# Patient Record
Sex: FEMALE | Race: WHITE | NOT HISPANIC OR LATINO
[De-identification: names, ages, dates, MRNs, and addresses within clinical notes are randomized per-mention and may not be internally consistent; named-entity substitution may affect disease eponyms.]

---

## 2017-01-26 ENCOUNTER — APPOINTMENT (OUTPATIENT)
Dept: MRI IMAGING | Facility: IMAGING CENTER | Age: 62
End: 2017-01-26

## 2017-01-26 ENCOUNTER — OUTPATIENT (OUTPATIENT)
Dept: OUTPATIENT SERVICES | Facility: HOSPITAL | Age: 62
LOS: 1 days | End: 2017-01-26
Payer: COMMERCIAL

## 2017-01-26 DIAGNOSIS — Z00.8 ENCOUNTER FOR OTHER GENERAL EXAMINATION: ICD-10-CM

## 2017-01-26 PROCEDURE — 73721 MRI JNT OF LWR EXTRE W/O DYE: CPT

## 2017-01-26 PROCEDURE — 73721 MRI JNT OF LWR EXTRE W/O DYE: CPT | Mod: 26,RT

## 2017-02-01 ENCOUNTER — APPOINTMENT (OUTPATIENT)
Dept: ORTHOPEDIC SURGERY | Facility: CLINIC | Age: 62
End: 2017-02-01

## 2017-09-16 ENCOUNTER — OUTPATIENT (OUTPATIENT)
Dept: OUTPATIENT SERVICES | Facility: HOSPITAL | Age: 62
LOS: 1 days | End: 2017-09-16
Payer: COMMERCIAL

## 2017-09-16 ENCOUNTER — APPOINTMENT (OUTPATIENT)
Dept: MRI IMAGING | Facility: IMAGING CENTER | Age: 62
End: 2017-09-16
Payer: COMMERCIAL

## 2017-09-16 DIAGNOSIS — Z00.8 ENCOUNTER FOR OTHER GENERAL EXAMINATION: ICD-10-CM

## 2017-09-16 PROCEDURE — 72141 MRI NECK SPINE W/O DYE: CPT

## 2017-09-16 PROCEDURE — 72141 MRI NECK SPINE W/O DYE: CPT | Mod: 26

## 2017-12-15 ENCOUNTER — APPOINTMENT (OUTPATIENT)
Dept: ORTHOPEDIC SURGERY | Facility: CLINIC | Age: 62
End: 2017-12-15
Payer: COMMERCIAL

## 2017-12-15 VITALS — DIASTOLIC BLOOD PRESSURE: 79 MMHG | SYSTOLIC BLOOD PRESSURE: 121 MMHG | HEART RATE: 79 BPM

## 2017-12-15 VITALS — BODY MASS INDEX: 26.58 KG/M2 | WEIGHT: 150 LBS | HEIGHT: 63 IN

## 2017-12-15 PROCEDURE — 99203 OFFICE O/P NEW LOW 30 MIN: CPT | Mod: 25

## 2017-12-15 PROCEDURE — 20610 DRAIN/INJ JOINT/BURSA W/O US: CPT | Mod: LT

## 2017-12-15 PROCEDURE — 73030 X-RAY EXAM OF SHOULDER: CPT | Mod: LT

## 2018-02-14 ENCOUNTER — APPOINTMENT (OUTPATIENT)
Dept: GASTROENTEROLOGY | Facility: CLINIC | Age: 63
End: 2018-02-14
Payer: COMMERCIAL

## 2018-02-14 VITALS
WEIGHT: 155 LBS | RESPIRATION RATE: 16 BRPM | OXYGEN SATURATION: 98 % | BODY MASS INDEX: 27.46 KG/M2 | HEART RATE: 78 BPM | SYSTOLIC BLOOD PRESSURE: 120 MMHG | HEIGHT: 63 IN | DIASTOLIC BLOOD PRESSURE: 62 MMHG

## 2018-02-14 DIAGNOSIS — Z78.9 OTHER SPECIFIED HEALTH STATUS: ICD-10-CM

## 2018-02-14 DIAGNOSIS — Z82.61 FAMILY HISTORY OF ARTHRITIS: ICD-10-CM

## 2018-02-14 PROCEDURE — 99203 OFFICE O/P NEW LOW 30 MIN: CPT

## 2018-02-14 RX ORDER — AMOXICILLIN 500 MG/1
500 CAPSULE ORAL
Qty: 14 | Refills: 0 | Status: DISCONTINUED | COMMUNITY
Start: 2017-08-30 | End: 2018-02-14

## 2018-02-15 ENCOUNTER — APPOINTMENT (OUTPATIENT)
Dept: ORTHOPEDIC SURGERY | Facility: CLINIC | Age: 63
End: 2018-02-15
Payer: COMMERCIAL

## 2018-02-15 PROCEDURE — 99213 OFFICE O/P EST LOW 20 MIN: CPT

## 2018-02-20 ENCOUNTER — RX RENEWAL (OUTPATIENT)
Age: 63
End: 2018-02-20

## 2018-02-21 ENCOUNTER — FORM ENCOUNTER (OUTPATIENT)
Age: 63
End: 2018-02-21

## 2018-02-21 ENCOUNTER — RESULT REVIEW (OUTPATIENT)
Age: 63
End: 2018-02-21

## 2018-02-21 ENCOUNTER — APPOINTMENT (OUTPATIENT)
Dept: GASTROENTEROLOGY | Facility: HOSPITAL | Age: 63
End: 2018-02-21

## 2018-02-21 ENCOUNTER — OUTPATIENT (OUTPATIENT)
Dept: OUTPATIENT SERVICES | Facility: HOSPITAL | Age: 63
LOS: 1 days | End: 2018-02-21
Payer: COMMERCIAL

## 2018-02-21 DIAGNOSIS — Z12.11 ENCOUNTER FOR SCREENING FOR MALIGNANT NEOPLASM OF COLON: ICD-10-CM

## 2018-02-21 PROCEDURE — 88305 TISSUE EXAM BY PATHOLOGIST: CPT

## 2018-02-21 PROCEDURE — 88305 TISSUE EXAM BY PATHOLOGIST: CPT | Mod: 26

## 2018-02-21 PROCEDURE — 45380 COLONOSCOPY AND BIOPSY: CPT | Mod: 33

## 2018-02-21 PROCEDURE — 45380 COLONOSCOPY AND BIOPSY: CPT | Mod: PT

## 2018-02-21 RX ORDER — SODIUM SULFATE, POTASSIUM SULFATE, MAGNESIUM SULFATE 17.5; 3.13; 1.6 G/ML; G/ML; G/ML
17.5-3.13-1.6 SOLUTION, CONCENTRATE ORAL
Qty: 1 | Refills: 0 | Status: DISCONTINUED | COMMUNITY
Start: 2018-02-14 | End: 2018-02-21

## 2018-02-22 ENCOUNTER — OUTPATIENT (OUTPATIENT)
Dept: OUTPATIENT SERVICES | Facility: HOSPITAL | Age: 63
LOS: 1 days | End: 2018-02-22
Payer: COMMERCIAL

## 2018-02-22 ENCOUNTER — APPOINTMENT (OUTPATIENT)
Dept: MRI IMAGING | Facility: IMAGING CENTER | Age: 63
End: 2018-02-22

## 2018-02-22 ENCOUNTER — TRANSCRIPTION ENCOUNTER (OUTPATIENT)
Age: 63
End: 2018-02-22

## 2018-02-22 DIAGNOSIS — M75.42 IMPINGEMENT SYNDROME OF LEFT SHOULDER: ICD-10-CM

## 2018-02-22 PROCEDURE — 73221 MRI JOINT UPR EXTREM W/O DYE: CPT | Mod: 26,LT

## 2018-02-22 PROCEDURE — 73221 MRI JOINT UPR EXTREM W/O DYE: CPT

## 2018-02-28 ENCOUNTER — APPOINTMENT (OUTPATIENT)
Dept: ORTHOPEDIC SURGERY | Facility: CLINIC | Age: 63
End: 2018-02-28
Payer: COMMERCIAL

## 2018-02-28 ENCOUNTER — APPOINTMENT (OUTPATIENT)
Dept: ORTHOPEDIC SURGERY | Facility: CLINIC | Age: 63
End: 2018-02-28

## 2018-02-28 DIAGNOSIS — M25.512 PAIN IN LEFT SHOULDER: ICD-10-CM

## 2018-02-28 PROCEDURE — 99213 OFFICE O/P EST LOW 20 MIN: CPT

## 2018-03-06 ENCOUNTER — OUTPATIENT (OUTPATIENT)
Dept: OUTPATIENT SERVICES | Facility: HOSPITAL | Age: 63
LOS: 1 days | End: 2018-03-06
Payer: COMMERCIAL

## 2018-03-06 VITALS
HEART RATE: 69 BPM | TEMPERATURE: 97 F | HEIGHT: 62 IN | OXYGEN SATURATION: 98 % | RESPIRATION RATE: 18 BRPM | SYSTOLIC BLOOD PRESSURE: 122 MMHG | WEIGHT: 154.98 LBS | DIASTOLIC BLOOD PRESSURE: 72 MMHG

## 2018-03-06 DIAGNOSIS — Z98.891 HISTORY OF UTERINE SCAR FROM PREVIOUS SURGERY: Chronic | ICD-10-CM

## 2018-03-06 DIAGNOSIS — Z98.890 OTHER SPECIFIED POSTPROCEDURAL STATES: Chronic | ICD-10-CM

## 2018-03-06 DIAGNOSIS — S46.812D STRAIN OF OTHER MUSCLES, FASCIA AND TENDONS AT SHOULDER AND UPPER ARM LEVEL, LEFT ARM, SUBSEQUENT ENCOUNTER: ICD-10-CM

## 2018-03-06 DIAGNOSIS — Z90.49 ACQUIRED ABSENCE OF OTHER SPECIFIED PARTS OF DIGESTIVE TRACT: Chronic | ICD-10-CM

## 2018-03-06 DIAGNOSIS — M75.42 IMPINGEMENT SYNDROME OF LEFT SHOULDER: ICD-10-CM

## 2018-03-06 LAB
ALBUMIN SERPL ELPH-MCNC: 4.7 G/DL — SIGNIFICANT CHANGE UP (ref 3.3–5)
ALP SERPL-CCNC: 59 U/L — SIGNIFICANT CHANGE UP (ref 40–120)
ALT FLD-CCNC: 28 U/L — SIGNIFICANT CHANGE UP (ref 4–33)
AST SERPL-CCNC: 25 U/L — SIGNIFICANT CHANGE UP (ref 4–32)
BILIRUB SERPL-MCNC: 0.3 MG/DL — SIGNIFICANT CHANGE UP (ref 0.2–1.2)
BUN SERPL-MCNC: 16 MG/DL — SIGNIFICANT CHANGE UP (ref 7–23)
CALCIUM SERPL-MCNC: 9.1 MG/DL — SIGNIFICANT CHANGE UP (ref 8.4–10.5)
CHLORIDE SERPL-SCNC: 103 MMOL/L — SIGNIFICANT CHANGE UP (ref 98–107)
CO2 SERPL-SCNC: 27 MMOL/L — SIGNIFICANT CHANGE UP (ref 22–31)
CREAT SERPL-MCNC: 0.65 MG/DL — SIGNIFICANT CHANGE UP (ref 0.5–1.3)
GLUCOSE SERPL-MCNC: 103 MG/DL — HIGH (ref 70–99)
HCT VFR BLD CALC: 41 % — SIGNIFICANT CHANGE UP (ref 34.5–45)
HCV AB S/CO SERPL IA: 0.11 S/CO — SIGNIFICANT CHANGE UP
HCV AB SERPL-IMP: SIGNIFICANT CHANGE UP
HGB BLD-MCNC: 13.1 G/DL — SIGNIFICANT CHANGE UP (ref 11.5–15.5)
MCHC RBC-ENTMCNC: 28.9 PG — SIGNIFICANT CHANGE UP (ref 27–34)
MCHC RBC-ENTMCNC: 32 % — SIGNIFICANT CHANGE UP (ref 32–36)
MCV RBC AUTO: 90.5 FL — SIGNIFICANT CHANGE UP (ref 80–100)
NRBC # FLD: 0 — SIGNIFICANT CHANGE UP
PLATELET # BLD AUTO: 348 K/UL — SIGNIFICANT CHANGE UP (ref 150–400)
PMV BLD: 9.1 FL — SIGNIFICANT CHANGE UP (ref 7–13)
POTASSIUM SERPL-MCNC: 4.5 MMOL/L — SIGNIFICANT CHANGE UP (ref 3.5–5.3)
POTASSIUM SERPL-SCNC: 4.5 MMOL/L — SIGNIFICANT CHANGE UP (ref 3.5–5.3)
PROT SERPL-MCNC: 7.5 G/DL — SIGNIFICANT CHANGE UP (ref 6–8.3)
RBC # BLD: 4.53 M/UL — SIGNIFICANT CHANGE UP (ref 3.8–5.2)
RBC # FLD: 13.1 % — SIGNIFICANT CHANGE UP (ref 10.3–14.5)
SODIUM SERPL-SCNC: 143 MMOL/L — SIGNIFICANT CHANGE UP (ref 135–145)
WBC # BLD: 7.65 K/UL — SIGNIFICANT CHANGE UP (ref 3.8–10.5)
WBC # FLD AUTO: 7.65 K/UL — SIGNIFICANT CHANGE UP (ref 3.8–10.5)

## 2018-03-06 PROCEDURE — 93010 ELECTROCARDIOGRAM REPORT: CPT

## 2018-03-06 NOTE — H&P PST ADULT - NEGATIVE OPHTHALMOLOGIC SYMPTOMS
no pain R/no pain L/no irritation R/no discharge R/no photophobia/no blurred vision R/no blurred vision L/no discharge L/no loss of vision R/no scleral injection R/no lacrimation L/no lacrimation R/no diplopia/no irritation L/no loss of vision L/no scleral injection L

## 2018-03-06 NOTE — H&P PST ADULT - NEGATIVE CARDIOVASCULAR SYMPTOMS
no dyspnea on exertion/no orthopnea/no palpitations/no peripheral edema/no paroxysmal nocturnal dyspnea/no chest pain/no claudication

## 2018-03-06 NOTE — H&P PST ADULT - PMH
Carpal tunnel syndrome    Cholecystitis    GERD (gastroesophageal reflux disease)    Meniscal injury  right knee  Shingles

## 2018-03-06 NOTE — H&P PST ADULT - FAMILY HISTORY
Father  Still living? Yes, Estimated age: Age Unknown  Family history of diabetes mellitus (DM), Age at diagnosis: Age Unknown  Family history of other heart disease, Age at diagnosis: Age Unknown     Mother  Still living? Yes, Estimated age: Age Unknown  Family history of GERD, Age at diagnosis: Age Unknown

## 2018-03-06 NOTE — H&P PST ADULT - NSANTHOSAYNRD_GEN_A_CORE
never tested/No. ERIN screening performed.  STOP BANG Legend: 0-2 = LOW Risk; 3-4 = INTERMEDIATE Risk; 5-8 = HIGH Risk

## 2018-03-06 NOTE — H&P PST ADULT - RS GEN PE MLT RESP DETAILS PC
clear to auscultation bilaterally/respirations non-labored/no rhonchi/no wheezes/no intercostal retractions/no rales/good air movement/no subcutaneous emphysema/breath sounds equal/airway patent

## 2018-03-06 NOTE — H&P PST ADULT - NEGATIVE MUSCULOSKELETAL SYMPTOMS
no muscle weakness/no stiffness/no arm pain R/no leg pain L/no neck pain/no arthritis/no leg pain R/no muscle cramps

## 2018-03-06 NOTE — H&P PST ADULT - NEGATIVE GASTROINTESTINAL SYMPTOMS
no abdominal pain/no hematochezia/no constipation/no melena/no change in bowel habits/no vomiting/no diarrhea/no flatulence/no nausea

## 2018-03-06 NOTE — H&P PST ADULT - VISION (WITH CORRECTIVE LENSES IF THE PATIENT USUALLY WEARS THEM):
Partially impaired: cannot see medication labels or newsprint, but can see obstacles in path, and the surrounding layout; can count fingers at arm's length/pt wears glasses

## 2018-03-06 NOTE — H&P PST ADULT - MUSCULOSKELETAL
details… detailed exam decreased ROM due to pain/no joint warmth/no calf tenderness/no joint erythema/diminished strength

## 2018-03-06 NOTE — H&P PST ADULT - PROBLEM SELECTOR PLAN 1
Pt is scheduled for a left shoulder arthroscopy, rotator cuff repair, subacromial decompression on 3/8/18.   Preop instructions provided including Pepcid, Hibiclens protocols, NPO status, no NSAIDs or OTC meds/ herbals starting today. Verbilized understanding.   Pt states was seen by pcp 2 wk's ago. Pt asked to return for repeat ekg due to  cough symptoms noted and showing abnormal ekg. called office, Spoke to Yasmin, stated Pt was cleared, will fax reports. (BW and EKG reports not sent while pt t PST, done).   Pt. stated Hx of Hepatitis but unsure of type. Hep C ordered.   Pt is a JW, HCP copy placed in chart. States Surgeon aware and has a copy. OR booking notified as well. Pt is scheduled for a left shoulder arthroscopy, rotator cuff repair, subacromial decompression on 3/8/18.   Preop instructions provided including Pepcid, Hibiclens protocols, NPO status, no NSAIDs or OTC meds/ herbals starting today. Verbalized understanding.   Pt states was seen by pcp 2 wk's ago. Pt asked to return for repeat ekg due to  cough symptoms noted and showing abnormal ekg. called office, Spoke to Yasmin, stated Pt was cleared, will fax reports. (BW and EKG reports not sent while pt t PST, done prior to leaving unit). PST to follow up on records.   Pt. stated Hx of Hepatitis but unsure of type. Hep C ordered.   Pt is a JW, HCP copy placed in chart. States Surgeon aware and has a copy. OR booking notified as well.

## 2018-03-06 NOTE — H&P PST ADULT - NEGATIVE PSYCHIATRIC SYMPTOMS
no visual hallucinations/no memory loss/no auditory hallucinations/no paranoia/no insomnia/no mood swings/no agitation/no hyperactivity/no suicidal ideation/no depression

## 2018-03-06 NOTE — H&P PST ADULT - HISTORY OF PRESENT ILLNESS
63 y/o female with PMH of Hepatitis (unsure of type), herniated disc of cervical and lumbar spine. Presents to PST with a preop dx of impingement syndrome of left shoulder, strain of other muscles, fascia and tendons at shoulder and upper arm level, left arm, subsequent encounter and to be evaluated for a scheduled left shoulder arthroscopy, rotator cuff repair, subacromial decompression on 3/8/18. Pt states since 2 months has been feeling pain after doing push ups. Pain worsen so went to see Dr Hernandez who gave her a cortisone injection w/o relief. An MRI done and revealed "an injury" as per pt. Recommended surgical intervention at this time for which is now scheduled.

## 2018-03-06 NOTE — H&P PST ADULT - ASSESSMENT
DX: impingement syndrome of left shoulder, strain of other muscles, fascia and tendons at shoulder and upper arm level, left arm, subsequent encounter

## 2018-03-06 NOTE — H&P PST ADULT - NEGATIVE ALLERGY TYPES
no reactions to food/no reactions to insect bites/no outdoor environmental allergies/no indoor environmental allergies/no reactions to animals

## 2018-03-08 ENCOUNTER — OUTPATIENT (OUTPATIENT)
Dept: OUTPATIENT SERVICES | Facility: HOSPITAL | Age: 63
LOS: 1 days | Discharge: HOME | End: 2018-03-08
Payer: COMMERCIAL

## 2018-03-08 ENCOUNTER — TRANSCRIPTION ENCOUNTER (OUTPATIENT)
Age: 63
End: 2018-03-08

## 2018-03-08 ENCOUNTER — APPOINTMENT (OUTPATIENT)
Dept: ORTHOPEDIC SURGERY | Facility: AMBULATORY SURGERY CENTER | Age: 63
End: 2018-03-08

## 2018-03-08 VITALS
DIASTOLIC BLOOD PRESSURE: 70 MMHG | HEART RATE: 87 BPM | TEMPERATURE: 98 F | SYSTOLIC BLOOD PRESSURE: 128 MMHG | OXYGEN SATURATION: 100 % | RESPIRATION RATE: 14 BRPM

## 2018-03-08 VITALS
HEART RATE: 65 BPM | SYSTOLIC BLOOD PRESSURE: 128 MMHG | OXYGEN SATURATION: 97 % | DIASTOLIC BLOOD PRESSURE: 67 MMHG | TEMPERATURE: 98 F | WEIGHT: 154.98 LBS | RESPIRATION RATE: 18 BRPM | HEIGHT: 62 IN

## 2018-03-08 DIAGNOSIS — Z98.890 OTHER SPECIFIED POSTPROCEDURAL STATES: Chronic | ICD-10-CM

## 2018-03-08 DIAGNOSIS — Z98.891 HISTORY OF UTERINE SCAR FROM PREVIOUS SURGERY: Chronic | ICD-10-CM

## 2018-03-08 DIAGNOSIS — S46.812D STRAIN OF OTHER MUSCLES, FASCIA AND TENDONS AT SHOULDER AND UPPER ARM LEVEL, LEFT ARM, SUBSEQUENT ENCOUNTER: ICD-10-CM

## 2018-03-08 DIAGNOSIS — Z90.49 ACQUIRED ABSENCE OF OTHER SPECIFIED PARTS OF DIGESTIVE TRACT: Chronic | ICD-10-CM

## 2018-03-08 PROCEDURE — 29827 SHO ARTHRS SRG RT8TR CUF RPR: CPT | Mod: LT

## 2018-03-08 PROCEDURE — 29826 SHO ARTHRS SRG DECOMPRESSION: CPT | Mod: LT

## 2018-03-08 RX ORDER — OXYCODONE HYDROCHLORIDE 5 MG/1
1 TABLET ORAL
Qty: 50 | Refills: 0 | OUTPATIENT
Start: 2018-03-08

## 2018-03-08 NOTE — ASU DISCHARGE PLAN (ADULT/PEDIATRIC). - PAIN
prescription called to pharmacy/Do not mix Percocet with Tylenol (acetaminophen) as it already contains Tylenol.

## 2018-03-08 NOTE — ASU DISCHARGE PLAN (ADULT/PEDIATRIC). - NURSING INSTRUCTIONS
Narcotic pain medication may cause nausea or constipation. Take medication with food. Increase fluids and fiber intake.     Apply ice for 20 minutes several times per day for the next 72 hours, then as needed for comfort.

## 2018-03-08 NOTE — ASU DISCHARGE PLAN (ADULT/PEDIATRIC). - ASU FOLLOWUP
[facility] ([facility]):

## 2018-03-16 ENCOUNTER — APPOINTMENT (OUTPATIENT)
Dept: ORTHOPEDIC SURGERY | Facility: CLINIC | Age: 63
End: 2018-03-16
Payer: COMMERCIAL

## 2018-03-16 PROCEDURE — 99024 POSTOP FOLLOW-UP VISIT: CPT

## 2018-04-06 ENCOUNTER — APPOINTMENT (OUTPATIENT)
Dept: ORTHOPEDIC SURGERY | Facility: CLINIC | Age: 63
End: 2018-04-06
Payer: COMMERCIAL

## 2018-04-06 PROCEDURE — 99024 POSTOP FOLLOW-UP VISIT: CPT

## 2018-05-09 ENCOUNTER — APPOINTMENT (OUTPATIENT)
Dept: ORTHOPEDIC SURGERY | Facility: CLINIC | Age: 63
End: 2018-05-09
Payer: COMMERCIAL

## 2018-05-09 DIAGNOSIS — M17.0 BILATERAL PRIMARY OSTEOARTHRITIS OF KNEE: ICD-10-CM

## 2018-05-09 PROCEDURE — 99024 POSTOP FOLLOW-UP VISIT: CPT

## 2018-06-29 ENCOUNTER — APPOINTMENT (OUTPATIENT)
Dept: ORTHOPEDIC SURGERY | Facility: CLINIC | Age: 63
End: 2018-06-29
Payer: COMMERCIAL

## 2018-06-29 DIAGNOSIS — M75.42 IMPINGEMENT SYNDROME OF LEFT SHOULDER: ICD-10-CM

## 2018-06-29 PROCEDURE — 99213 OFFICE O/P EST LOW 20 MIN: CPT

## 2018-08-28 ENCOUNTER — APPOINTMENT (OUTPATIENT)
Dept: MRI IMAGING | Facility: IMAGING CENTER | Age: 63
End: 2018-08-28
Payer: COMMERCIAL

## 2018-08-28 ENCOUNTER — OUTPATIENT (OUTPATIENT)
Dept: OUTPATIENT SERVICES | Facility: HOSPITAL | Age: 63
LOS: 1 days | End: 2018-08-28
Payer: COMMERCIAL

## 2018-08-28 DIAGNOSIS — Z98.891 HISTORY OF UTERINE SCAR FROM PREVIOUS SURGERY: Chronic | ICD-10-CM

## 2018-08-28 DIAGNOSIS — Z00.8 ENCOUNTER FOR OTHER GENERAL EXAMINATION: ICD-10-CM

## 2018-08-28 DIAGNOSIS — Z90.49 ACQUIRED ABSENCE OF OTHER SPECIFIED PARTS OF DIGESTIVE TRACT: Chronic | ICD-10-CM

## 2018-08-28 DIAGNOSIS — Z98.890 OTHER SPECIFIED POSTPROCEDURAL STATES: Chronic | ICD-10-CM

## 2018-08-28 PROBLEM — S83.8X9A SPRAIN OF OTHER SPECIFIED PARTS OF UNSPECIFIED KNEE, INITIAL ENCOUNTER: Chronic | Status: ACTIVE | Noted: 2018-03-06

## 2018-08-28 PROBLEM — G56.00 CARPAL TUNNEL SYNDROME, UNSPECIFIED UPPER LIMB: Chronic | Status: ACTIVE | Noted: 2018-03-06

## 2018-08-28 PROBLEM — K81.9 CHOLECYSTITIS, UNSPECIFIED: Chronic | Status: ACTIVE | Noted: 2018-03-06

## 2018-08-28 PROCEDURE — 72148 MRI LUMBAR SPINE W/O DYE: CPT | Mod: 26

## 2018-08-28 PROCEDURE — 72148 MRI LUMBAR SPINE W/O DYE: CPT

## 2018-09-20 ENCOUNTER — APPOINTMENT (OUTPATIENT)
Dept: ORTHOPEDIC SURGERY | Facility: CLINIC | Age: 63
End: 2018-09-20
Payer: COMMERCIAL

## 2018-09-20 VITALS
HEART RATE: 63 BPM | BODY MASS INDEX: 27.46 KG/M2 | SYSTOLIC BLOOD PRESSURE: 113 MMHG | HEIGHT: 63 IN | DIASTOLIC BLOOD PRESSURE: 74 MMHG | WEIGHT: 155 LBS

## 2018-09-20 DIAGNOSIS — S46.812D STRAIN OF OTHER MUSCLES, FASCIA AND TENDONS AT SHOULDER AND UPPER ARM LEVEL, LEFT ARM, SUBSEQUENT ENCOUNTER: ICD-10-CM

## 2018-09-20 DIAGNOSIS — Z98.890 OTHER SPECIFIED POSTPROCEDURAL STATES: ICD-10-CM

## 2018-09-20 PROCEDURE — 99213 OFFICE O/P EST LOW 20 MIN: CPT

## 2018-10-17 ENCOUNTER — FORM ENCOUNTER (OUTPATIENT)
Age: 63
End: 2018-10-17

## 2018-10-18 ENCOUNTER — OUTPATIENT (OUTPATIENT)
Dept: OUTPATIENT SERVICES | Facility: HOSPITAL | Age: 63
LOS: 1 days | End: 2018-10-18
Payer: COMMERCIAL

## 2018-10-18 ENCOUNTER — APPOINTMENT (OUTPATIENT)
Dept: NEUROSURGERY | Facility: CLINIC | Age: 63
End: 2018-10-18
Payer: COMMERCIAL

## 2018-10-18 VITALS
RESPIRATION RATE: 18 BRPM | DIASTOLIC BLOOD PRESSURE: 71 MMHG | HEART RATE: 70 BPM | WEIGHT: 157 LBS | HEIGHT: 63 IN | OXYGEN SATURATION: 98 % | SYSTOLIC BLOOD PRESSURE: 121 MMHG | BODY MASS INDEX: 27.82 KG/M2

## 2018-10-18 DIAGNOSIS — Z98.891 HISTORY OF UTERINE SCAR FROM PREVIOUS SURGERY: Chronic | ICD-10-CM

## 2018-10-18 DIAGNOSIS — Z87.39 PERSONAL HISTORY OF OTHER DISEASES OF THE MUSCULOSKELETAL SYSTEM AND CONNECTIVE TISSUE: ICD-10-CM

## 2018-10-18 DIAGNOSIS — Z98.890 OTHER SPECIFIED POSTPROCEDURAL STATES: Chronic | ICD-10-CM

## 2018-10-18 DIAGNOSIS — Z86.59 PERSONAL HISTORY OF OTHER MENTAL AND BEHAVIORAL DISORDERS: ICD-10-CM

## 2018-10-18 DIAGNOSIS — Z90.49 ACQUIRED ABSENCE OF OTHER SPECIFIED PARTS OF DIGESTIVE TRACT: Chronic | ICD-10-CM

## 2018-10-18 DIAGNOSIS — M48.061 SPINAL STENOSIS, LUMBAR REGION WITHOUT NEUROGENIC CLAUDICATION: ICD-10-CM

## 2018-10-18 DIAGNOSIS — Z87.828 PERSONAL HISTORY OF OTHER (HEALED) PHYSICAL INJURY AND TRAUMA: ICD-10-CM

## 2018-10-18 DIAGNOSIS — Z86.19 PERSONAL HISTORY OF OTHER INFECTIOUS AND PARASITIC DISEASES: ICD-10-CM

## 2018-10-18 DIAGNOSIS — M43.16 SPONDYLOLISTHESIS, LUMBAR REGION: ICD-10-CM

## 2018-10-18 DIAGNOSIS — M54.5 LOW BACK PAIN: ICD-10-CM

## 2018-10-18 PROCEDURE — 99244 OFF/OP CNSLTJ NEW/EST MOD 40: CPT

## 2018-10-18 PROCEDURE — 72083 X-RAY EXAM ENTIRE SPI 4/5 VW: CPT | Mod: 26

## 2018-10-18 PROCEDURE — 72110 X-RAY EXAM L-2 SPINE 4/>VWS: CPT

## 2018-10-18 PROCEDURE — 72082 X-RAY EXAM ENTIRE SPI 2/3 VW: CPT

## 2018-10-18 RX ORDER — ACETAMINOPHEN 325 MG
TABLET ORAL
Refills: 0 | Status: ACTIVE | COMMUNITY

## 2018-10-18 RX ORDER — POLYETHYLENE GLYCOL 3350, SODIUM CHLORIDE, POTASSIUM CHLORIDE, SODIUM BICARBONATE, AND SODIUM SULFATE 240; 5.84; 2.98; 6.72; 22.72 G/4L; G/4L; G/4L; G/4L; G/4L
240 POWDER, FOR SOLUTION ORAL
Qty: 1 | Refills: 0 | Status: DISCONTINUED | COMMUNITY
Start: 2018-02-20 | End: 2018-10-18

## 2018-10-18 RX ORDER — OXYCODONE AND ACETAMINOPHEN 5; 325 MG/1; MG/1
5-325 TABLET ORAL
Qty: 42 | Refills: 0 | Status: DISCONTINUED | COMMUNITY
Start: 2018-03-08 | End: 2018-10-18

## 2018-10-18 RX ORDER — FLUOCINONIDE 0.5 MG/ML
0.05 SOLUTION TOPICAL
Qty: 60 | Refills: 0 | Status: DISCONTINUED | COMMUNITY
Start: 2017-06-23 | End: 2018-10-18

## 2018-10-18 RX ORDER — TACROLIMUS 0.3 MG/G
0.03 OINTMENT TOPICAL
Qty: 100 | Refills: 0 | Status: DISCONTINUED | COMMUNITY
Start: 2016-09-12 | End: 2018-10-18

## 2018-10-18 RX ORDER — DESONIDE 0.5 MG/G
0.05 OINTMENT TOPICAL
Qty: 60 | Refills: 0 | Status: DISCONTINUED | COMMUNITY
Start: 2016-09-12 | End: 2018-10-18

## 2018-10-18 RX ORDER — BACILLUS COAGULANS/INULIN 1B-250 MG
CAPSULE ORAL
Refills: 0 | Status: ACTIVE | COMMUNITY

## 2018-10-18 RX ORDER — CYCLOBENZAPRINE HYDROCHLORIDE 5 MG/1
5 TABLET, FILM COATED ORAL
Qty: 15 | Refills: 0 | Status: DISCONTINUED | COMMUNITY
Start: 2017-07-30 | End: 2018-10-18

## 2018-10-18 RX ORDER — METHYLPREDNISOLONE 4 MG/1
4 TABLET ORAL
Qty: 21 | Refills: 0 | Status: DISCONTINUED | COMMUNITY
Start: 2017-08-03 | End: 2018-10-18

## 2018-10-18 RX ORDER — MV-MIN/FOLIC/VIT K/LYCOP/COQ10 200-100MCG
CAPSULE ORAL
Refills: 0 | Status: ACTIVE | COMMUNITY

## 2018-10-18 RX ORDER — GABAPENTIN 300 MG/1
300 CAPSULE ORAL
Qty: 180 | Refills: 0 | Status: DISCONTINUED | COMMUNITY
Start: 2017-08-03 | End: 2018-10-18

## 2018-10-18 RX ORDER — TRAMADOL HYDROCHLORIDE 50 MG/1
50 TABLET, COATED ORAL
Qty: 10 | Refills: 0 | Status: DISCONTINUED | COMMUNITY
Start: 2017-07-30 | End: 2018-10-18

## 2018-10-19 PROBLEM — M54.5 MECHANICAL LOW BACK PAIN: Status: ACTIVE | Noted: 2018-10-18

## 2018-11-02 ENCOUNTER — APPOINTMENT (OUTPATIENT)
Dept: ORTHOPEDIC SURGERY | Facility: CLINIC | Age: 63
End: 2018-11-02
Payer: COMMERCIAL

## 2018-11-02 VITALS
BODY MASS INDEX: 27.82 KG/M2 | SYSTOLIC BLOOD PRESSURE: 120 MMHG | WEIGHT: 157 LBS | HEART RATE: 71 BPM | HEIGHT: 63 IN | DIASTOLIC BLOOD PRESSURE: 78 MMHG

## 2018-11-02 PROCEDURE — 73562 X-RAY EXAM OF KNEE 3: CPT | Mod: RT

## 2018-11-02 PROCEDURE — 99213 OFFICE O/P EST LOW 20 MIN: CPT

## 2018-11-09 ENCOUNTER — FORM ENCOUNTER (OUTPATIENT)
Age: 63
End: 2018-11-09

## 2018-11-10 ENCOUNTER — APPOINTMENT (OUTPATIENT)
Dept: CT IMAGING | Facility: HOSPITAL | Age: 63
End: 2018-11-10

## 2018-11-10 ENCOUNTER — OUTPATIENT (OUTPATIENT)
Dept: OUTPATIENT SERVICES | Facility: HOSPITAL | Age: 63
LOS: 1 days | End: 2018-11-10
Payer: COMMERCIAL

## 2018-11-10 DIAGNOSIS — Z98.890 OTHER SPECIFIED POSTPROCEDURAL STATES: Chronic | ICD-10-CM

## 2018-11-10 DIAGNOSIS — Z90.49 ACQUIRED ABSENCE OF OTHER SPECIFIED PARTS OF DIGESTIVE TRACT: Chronic | ICD-10-CM

## 2018-11-10 DIAGNOSIS — Z98.891 HISTORY OF UTERINE SCAR FROM PREVIOUS SURGERY: Chronic | ICD-10-CM

## 2018-11-10 PROCEDURE — 72131 CT LUMBAR SPINE W/O DYE: CPT | Mod: 26

## 2018-11-10 PROCEDURE — 72131 CT LUMBAR SPINE W/O DYE: CPT

## 2018-12-05 ENCOUNTER — APPOINTMENT (OUTPATIENT)
Dept: ORTHOPEDIC SURGERY | Facility: CLINIC | Age: 63
End: 2018-12-05
Payer: COMMERCIAL

## 2018-12-05 VITALS
HEIGHT: 63 IN | BODY MASS INDEX: 27.46 KG/M2 | WEIGHT: 155 LBS | SYSTOLIC BLOOD PRESSURE: 130 MMHG | HEART RATE: 69 BPM | DIASTOLIC BLOOD PRESSURE: 82 MMHG

## 2018-12-05 DIAGNOSIS — M17.12 UNILATERAL PRIMARY OSTEOARTHRITIS, LEFT KNEE: ICD-10-CM

## 2018-12-05 PROCEDURE — 99213 OFFICE O/P EST LOW 20 MIN: CPT

## 2019-01-08 ENCOUNTER — OUTPATIENT (OUTPATIENT)
Dept: OUTPATIENT SERVICES | Facility: HOSPITAL | Age: 64
LOS: 1 days | End: 2019-01-08

## 2019-01-08 VITALS
TEMPERATURE: 98 F | DIASTOLIC BLOOD PRESSURE: 70 MMHG | SYSTOLIC BLOOD PRESSURE: 110 MMHG | WEIGHT: 154.98 LBS | HEIGHT: 63 IN | RESPIRATION RATE: 16 BRPM | HEART RATE: 72 BPM

## 2019-01-08 DIAGNOSIS — Z90.49 ACQUIRED ABSENCE OF OTHER SPECIFIED PARTS OF DIGESTIVE TRACT: Chronic | ICD-10-CM

## 2019-01-08 DIAGNOSIS — M17.11 UNILATERAL PRIMARY OSTEOARTHRITIS, RIGHT KNEE: ICD-10-CM

## 2019-01-08 DIAGNOSIS — Z98.891 HISTORY OF UTERINE SCAR FROM PREVIOUS SURGERY: Chronic | ICD-10-CM

## 2019-01-08 DIAGNOSIS — Z98.890 OTHER SPECIFIED POSTPROCEDURAL STATES: Chronic | ICD-10-CM

## 2019-01-08 LAB
ALBUMIN SERPL ELPH-MCNC: 4.6 G/DL — SIGNIFICANT CHANGE UP (ref 3.3–5)
ALP SERPL-CCNC: 58 U/L — SIGNIFICANT CHANGE UP (ref 40–120)
ALT FLD-CCNC: 25 U/L — SIGNIFICANT CHANGE UP (ref 4–33)
APPEARANCE UR: CLEAR — SIGNIFICANT CHANGE UP
AST SERPL-CCNC: 21 U/L — SIGNIFICANT CHANGE UP (ref 4–32)
BILIRUB SERPL-MCNC: 0.3 MG/DL — SIGNIFICANT CHANGE UP (ref 0.2–1.2)
BILIRUB UR-MCNC: NEGATIVE — SIGNIFICANT CHANGE UP
BLD GP AB SCN SERPL QL: NEGATIVE — SIGNIFICANT CHANGE UP
BLOOD UR QL VISUAL: NEGATIVE — SIGNIFICANT CHANGE UP
BUN SERPL-MCNC: 16 MG/DL — SIGNIFICANT CHANGE UP (ref 7–23)
CALCIUM SERPL-MCNC: 9.7 MG/DL — SIGNIFICANT CHANGE UP (ref 8.4–10.5)
CHLORIDE SERPL-SCNC: 100 MMOL/L — SIGNIFICANT CHANGE UP (ref 98–107)
CO2 SERPL-SCNC: 28 MMOL/L — SIGNIFICANT CHANGE UP (ref 22–31)
COLOR SPEC: SIGNIFICANT CHANGE UP
CREAT SERPL-MCNC: 0.68 MG/DL — SIGNIFICANT CHANGE UP (ref 0.5–1.3)
GLUCOSE SERPL-MCNC: 91 MG/DL — SIGNIFICANT CHANGE UP (ref 70–99)
GLUCOSE UR-MCNC: NEGATIVE — SIGNIFICANT CHANGE UP
HBA1C BLD-MCNC: 5.6 % — SIGNIFICANT CHANGE UP (ref 4–5.6)
HCT VFR BLD CALC: 42.5 % — SIGNIFICANT CHANGE UP (ref 34.5–45)
HGB BLD-MCNC: 13.3 G/DL — SIGNIFICANT CHANGE UP (ref 11.5–15.5)
KETONES UR-MCNC: NEGATIVE — SIGNIFICANT CHANGE UP
LEUKOCYTE ESTERASE UR-ACNC: NEGATIVE — SIGNIFICANT CHANGE UP
MCHC RBC-ENTMCNC: 29 PG — SIGNIFICANT CHANGE UP (ref 27–34)
MCHC RBC-ENTMCNC: 31.3 % — LOW (ref 32–36)
MCV RBC AUTO: 92.6 FL — SIGNIFICANT CHANGE UP (ref 80–100)
NITRITE UR-MCNC: NEGATIVE — SIGNIFICANT CHANGE UP
NRBC # FLD: 0 K/UL — LOW (ref 25–125)
PH UR: 6 — SIGNIFICANT CHANGE UP (ref 5–8)
PLATELET # BLD AUTO: 363 K/UL — SIGNIFICANT CHANGE UP (ref 150–400)
PMV BLD: 9.6 FL — SIGNIFICANT CHANGE UP (ref 7–13)
POTASSIUM SERPL-MCNC: 3.9 MMOL/L — SIGNIFICANT CHANGE UP (ref 3.5–5.3)
POTASSIUM SERPL-SCNC: 3.9 MMOL/L — SIGNIFICANT CHANGE UP (ref 3.5–5.3)
PROT SERPL-MCNC: 7.3 G/DL — SIGNIFICANT CHANGE UP (ref 6–8.3)
PROT UR-MCNC: NEGATIVE — SIGNIFICANT CHANGE UP
RBC # BLD: 4.59 M/UL — SIGNIFICANT CHANGE UP (ref 3.8–5.2)
RBC # FLD: 13.2 % — SIGNIFICANT CHANGE UP (ref 10.3–14.5)
RH IG SCN BLD-IMP: POSITIVE — SIGNIFICANT CHANGE UP
SODIUM SERPL-SCNC: 138 MMOL/L — SIGNIFICANT CHANGE UP (ref 135–145)
SP GR SPEC: 1.02 — SIGNIFICANT CHANGE UP (ref 1–1.04)
UROBILINOGEN FLD QL: NORMAL — SIGNIFICANT CHANGE UP
WBC # BLD: 6.77 K/UL — SIGNIFICANT CHANGE UP (ref 3.8–10.5)
WBC # FLD AUTO: 6.77 K/UL — SIGNIFICANT CHANGE UP (ref 3.8–10.5)

## 2019-01-08 RX ORDER — SODIUM CHLORIDE 9 MG/ML
3 INJECTION INTRAMUSCULAR; INTRAVENOUS; SUBCUTANEOUS EVERY 8 HOURS
Qty: 0 | Refills: 0 | Status: DISCONTINUED | OUTPATIENT
Start: 2019-01-21 | End: 2019-01-24

## 2019-01-08 NOTE — H&P PST ADULT - VISION (WITH CORRECTIVE LENSES IF THE PATIENT USUALLY WEARS THEM):
pt wears glasses/Partially impaired: cannot see medication labels or newsprint, but can see obstacles in path, and the surrounding layout; can count fingers at arm's length

## 2019-01-08 NOTE — H&P PST ADULT - PMH
Carpal tunnel syndrome    Cholecystitis    GERD (gastroesophageal reflux disease)    Meniscal injury  right knee  Shingles Carpal tunnel syndrome    Cholecystitis    GERD (gastroesophageal reflux disease)    Meniscal injury  right knee  Shingles    Unilateral primary osteoarthritis, right knee

## 2019-01-08 NOTE — H&P PST ADULT - MUSCULOSKELETAL
details… detailed exam no joint erythema/diminished strength/decreased ROM due to pain/right knee/no joint warmth/no calf tenderness/joint swelling

## 2019-01-08 NOTE — H&P PST ADULT - RS GEN PE MLT RESP DETAILS PC
no rhonchi/breath sounds equal/airway patent/no rales/respirations non-labored/clear to auscultation bilaterally/no chest wall tenderness/no intercostal retractions/good air movement/no subcutaneous emphysema/no wheezes

## 2019-01-08 NOTE — H&P PST ADULT - NEGATIVE CARDIOVASCULAR SYMPTOMS
no chest pain/no dyspnea on exertion/no orthopnea/no peripheral edema/no paroxysmal nocturnal dyspnea/no claudication/no palpitations

## 2019-01-08 NOTE — H&P PST ADULT - PROBLEM SELECTOR PLAN 1
Scheduled for right total knee replacement on 01/21/19. Pre op instructions, famotidine given and explained. Pt verbalized understanding.

## 2019-01-08 NOTE — H&P PST ADULT - NEGATIVE BREAST SYMPTOMS
no breast lump R/no nipple discharge L/no breast lump L/no breast tenderness L/no breast tenderness R/no nipple discharge R

## 2019-01-08 NOTE — H&P PST ADULT - NEGATIVE OPHTHALMOLOGIC SYMPTOMS
no scleral injection L/no photophobia/no pain R/no loss of vision L/no pain L/no irritation L/no discharge L/no loss of vision R/no irritation R/no lacrimation L/no lacrimation R/no blurred vision L/no blurred vision R/no discharge R/no diplopia/no scleral injection R

## 2019-01-08 NOTE — H&P PST ADULT - NEGATIVE PSYCHIATRIC SYMPTOMS
no suicidal ideation/no depression/no visual hallucinations/no insomnia/no paranoia/no memory loss/no mood swings/no agitation/no auditory hallucinations/no hyperactivity

## 2019-01-08 NOTE — H&P PST ADULT - PSH
History of arthroscopy of left shoulder  2018  S/P   ,   S/P carpal tunnel release  B/L  S/P cholecystectomy    S/P right knee arthroscopy  x2

## 2019-01-08 NOTE — H&P PST ADULT - NEGATIVE GASTROINTESTINAL SYMPTOMS
no nausea/no diarrhea/no change in bowel habits/no vomiting/no hematochezia/no hiccoughs/no jaundice/no abdominal pain/no melena

## 2019-01-08 NOTE — H&P PST ADULT - HISTORY OF PRESENT ILLNESS
62 y/o female with PMH of Hepatitis (unsure of type), herniated disc of cervical and lumbar spine  . Presents to PST with a preop dx of impingement syndrome of left shoulder, strain of other muscles, fascia and tendons at shoulder and upper arm level, left arm, subsequent encounter and to be evaluated for a scheduled left shoulder arthroscopy, rotator cuff repair, subacromial decompression on 3/8/18. Pt states since 2 months has been feeling pain after doing push ups. Pain worsen so went to see Dr Hernandez who gave her a cortisone injection w/o relief. An MRI done and revealed "an injury" as per pt. Recommended surgical intervention at this time for which is now scheduled.     long term h/o constant right knee pain, radiating down to leg. Had P.T, cortisone injections with moderate improvement initially. Now scheduled for right total knee replacement on 01/21/19 64 y/o female with PMH of Hepatitis (unsure of type), herniated disc of cervical and lumbar spine presents to PST for pre op evaluation with long term h/o constant right knee pain, radiating down to leg. Had P.T, cortisone injections with moderate improvement initially. Now scheduled for right total knee replacement on 01/21/19

## 2019-01-09 LAB
SPECIMEN SOURCE: SIGNIFICANT CHANGE UP
SPECIMEN SOURCE: SIGNIFICANT CHANGE UP

## 2019-01-10 PROBLEM — M17.11 UNILATERAL PRIMARY OSTEOARTHRITIS, RIGHT KNEE: Chronic | Status: ACTIVE | Noted: 2019-01-08

## 2019-01-10 LAB
METHOD TYPE: SIGNIFICANT CHANGE UP
ORGANISM # SPEC MICROSCOPIC CNT: SIGNIFICANT CHANGE UP

## 2019-01-11 LAB
-  AMIKACIN: SIGNIFICANT CHANGE UP
-  AMPICILLIN/SULBACTAM: SIGNIFICANT CHANGE UP
-  AMPICILLIN: SIGNIFICANT CHANGE UP
-  AZTREONAM: SIGNIFICANT CHANGE UP
-  CEFAZOLIN: SIGNIFICANT CHANGE UP
-  CEFAZOLIN: SIGNIFICANT CHANGE UP
-  CEFEPIME: SIGNIFICANT CHANGE UP
-  CEFOXITIN: SIGNIFICANT CHANGE UP
-  CEFTAZIDIME: SIGNIFICANT CHANGE UP
-  CEFTRIAXONE: SIGNIFICANT CHANGE UP
-  CIPROFLOXACIN: SIGNIFICANT CHANGE UP
-  CIPROFLOXACIN: SIGNIFICANT CHANGE UP
-  ERTAPENEM: SIGNIFICANT CHANGE UP
-  GENTAMICIN: SIGNIFICANT CHANGE UP
-  GENTAMICIN: SIGNIFICANT CHANGE UP
-  LEVOFLOXACIN: SIGNIFICANT CHANGE UP
-  LEVOFLOXACIN: SIGNIFICANT CHANGE UP
-  MEROPENEM: SIGNIFICANT CHANGE UP
-  NITROFURANTOIN: SIGNIFICANT CHANGE UP
-  OXACILLIN: SIGNIFICANT CHANGE UP
-  PENICILLIN: SIGNIFICANT CHANGE UP
-  PIPERACILLIN/TAZOBACTAM: SIGNIFICANT CHANGE UP
-  RIFAMPIN.: SIGNIFICANT CHANGE UP
-  TETRACYCLINE: SIGNIFICANT CHANGE UP
-  TOBRAMYCIN: SIGNIFICANT CHANGE UP
-  TRIMETHOPRIM/SULFAMETHOXAZOLE: SIGNIFICANT CHANGE UP
-  TRIMETHOPRIM/SULFAMETHOXAZOLE: SIGNIFICANT CHANGE UP
-  VANCOMYCIN: SIGNIFICANT CHANGE UP
BACTERIA NPH CULT: SIGNIFICANT CHANGE UP
BACTERIA UR CULT: SIGNIFICANT CHANGE UP
METHOD TYPE: SIGNIFICANT CHANGE UP

## 2019-01-15 DIAGNOSIS — Z01.818 ENCOUNTER FOR OTHER PREPROCEDURAL EXAMINATION: ICD-10-CM

## 2019-01-16 DIAGNOSIS — R91.8 OTHER NONSPECIFIC ABNORMAL FINDING OF LUNG FIELD: ICD-10-CM

## 2019-01-18 NOTE — ASU PATIENT PROFILE, ADULT - PMH
Carpal tunnel syndrome    Cholecystitis    GERD (gastroesophageal reflux disease)    Meniscal injury  right knee  Shingles    Unilateral primary osteoarthritis, right knee

## 2019-01-20 ENCOUNTER — TRANSCRIPTION ENCOUNTER (OUTPATIENT)
Age: 64
End: 2019-01-20

## 2019-01-21 ENCOUNTER — INPATIENT (INPATIENT)
Facility: HOSPITAL | Age: 64
LOS: 2 days | Discharge: NURSING, REHAB OR LONG-TERM CARE | End: 2019-01-24
Attending: ORTHOPAEDIC SURGERY | Admitting: ORTHOPAEDIC SURGERY
Payer: COMMERCIAL

## 2019-01-21 ENCOUNTER — RESULT REVIEW (OUTPATIENT)
Age: 64
End: 2019-01-21

## 2019-01-21 ENCOUNTER — APPOINTMENT (OUTPATIENT)
Dept: ORTHOPEDIC SURGERY | Facility: HOSPITAL | Age: 64
End: 2019-01-21

## 2019-01-21 VITALS
HEART RATE: 70 BPM | WEIGHT: 156.09 LBS | DIASTOLIC BLOOD PRESSURE: 65 MMHG | TEMPERATURE: 98 F | OXYGEN SATURATION: 100 % | SYSTOLIC BLOOD PRESSURE: 117 MMHG | RESPIRATION RATE: 16 BRPM | HEIGHT: 63 IN

## 2019-01-21 DIAGNOSIS — M17.11 UNILATERAL PRIMARY OSTEOARTHRITIS, RIGHT KNEE: ICD-10-CM

## 2019-01-21 DIAGNOSIS — Z90.49 ACQUIRED ABSENCE OF OTHER SPECIFIED PARTS OF DIGESTIVE TRACT: Chronic | ICD-10-CM

## 2019-01-21 DIAGNOSIS — Z98.891 HISTORY OF UTERINE SCAR FROM PREVIOUS SURGERY: Chronic | ICD-10-CM

## 2019-01-21 DIAGNOSIS — Z98.890 OTHER SPECIFIED POSTPROCEDURAL STATES: Chronic | ICD-10-CM

## 2019-01-21 LAB
ANION GAP SERPL CALC-SCNC: 12 MMO/L — SIGNIFICANT CHANGE UP (ref 7–14)
BUN SERPL-MCNC: 13 MG/DL — SIGNIFICANT CHANGE UP (ref 7–23)
CALCIUM SERPL-MCNC: 8.8 MG/DL — SIGNIFICANT CHANGE UP (ref 8.4–10.5)
CHLORIDE SERPL-SCNC: 107 MMOL/L — SIGNIFICANT CHANGE UP (ref 98–107)
CO2 SERPL-SCNC: 21 MMOL/L — LOW (ref 22–31)
CREAT SERPL-MCNC: 0.67 MG/DL — SIGNIFICANT CHANGE UP (ref 0.5–1.3)
GLUCOSE BLDC GLUCOMTR-MCNC: 94 MG/DL — SIGNIFICANT CHANGE UP (ref 70–99)
GLUCOSE SERPL-MCNC: 107 MG/DL — HIGH (ref 70–99)
HCT VFR BLD CALC: 38.2 % — SIGNIFICANT CHANGE UP (ref 34.5–45)
HGB BLD-MCNC: 12.3 G/DL — SIGNIFICANT CHANGE UP (ref 11.5–15.5)
MCHC RBC-ENTMCNC: 29.5 PG — SIGNIFICANT CHANGE UP (ref 27–34)
MCHC RBC-ENTMCNC: 32.2 % — SIGNIFICANT CHANGE UP (ref 32–36)
MCV RBC AUTO: 91.6 FL — SIGNIFICANT CHANGE UP (ref 80–100)
NRBC # FLD: 0 K/UL — LOW (ref 25–125)
PLATELET # BLD AUTO: 303 K/UL — SIGNIFICANT CHANGE UP (ref 150–400)
PMV BLD: 8.8 FL — SIGNIFICANT CHANGE UP (ref 7–13)
POTASSIUM SERPL-MCNC: 4.5 MMOL/L — SIGNIFICANT CHANGE UP (ref 3.5–5.3)
POTASSIUM SERPL-SCNC: 4.5 MMOL/L — SIGNIFICANT CHANGE UP (ref 3.5–5.3)
RBC # BLD: 4.17 M/UL — SIGNIFICANT CHANGE UP (ref 3.8–5.2)
RBC # FLD: 13.1 % — SIGNIFICANT CHANGE UP (ref 10.3–14.5)
RH IG SCN BLD-IMP: POSITIVE — SIGNIFICANT CHANGE UP
SODIUM SERPL-SCNC: 140 MMOL/L — SIGNIFICANT CHANGE UP (ref 135–145)
WBC # BLD: 9.76 K/UL — SIGNIFICANT CHANGE UP (ref 3.8–10.5)
WBC # FLD AUTO: 9.76 K/UL — SIGNIFICANT CHANGE UP (ref 3.8–10.5)

## 2019-01-21 PROCEDURE — 73560 X-RAY EXAM OF KNEE 1 OR 2: CPT | Mod: 26,RT

## 2019-01-21 PROCEDURE — 88305 TISSUE EXAM BY PATHOLOGIST: CPT | Mod: 26

## 2019-01-21 PROCEDURE — 27447 TOTAL KNEE ARTHROPLASTY: CPT | Mod: RT

## 2019-01-21 PROCEDURE — 88311 DECALCIFY TISSUE: CPT | Mod: 26

## 2019-01-21 RX ORDER — SODIUM CHLORIDE 9 MG/ML
1000 INJECTION INTRAMUSCULAR; INTRAVENOUS; SUBCUTANEOUS ONCE
Qty: 0 | Refills: 0 | Status: DISCONTINUED | OUTPATIENT
Start: 2019-01-21 | End: 2019-01-24

## 2019-01-21 RX ORDER — MAGNESIUM HYDROXIDE 400 MG/1
30 TABLET, CHEWABLE ORAL DAILY
Qty: 0 | Refills: 0 | Status: DISCONTINUED | OUTPATIENT
Start: 2019-01-21 | End: 2019-01-24

## 2019-01-21 RX ORDER — HYDROMORPHONE HYDROCHLORIDE 2 MG/ML
0.5 INJECTION INTRAMUSCULAR; INTRAVENOUS; SUBCUTANEOUS EVERY 4 HOURS
Qty: 0 | Refills: 0 | Status: DISCONTINUED | OUTPATIENT
Start: 2019-01-21 | End: 2019-01-24

## 2019-01-21 RX ORDER — DOCUSATE SODIUM 100 MG
100 CAPSULE ORAL THREE TIMES A DAY
Qty: 0 | Refills: 0 | Status: DISCONTINUED | OUTPATIENT
Start: 2019-01-21 | End: 2019-01-24

## 2019-01-21 RX ORDER — DEXAMETHASONE 0.5 MG/5ML
10 ELIXIR ORAL ONCE
Qty: 0 | Refills: 0 | Status: COMPLETED | OUTPATIENT
Start: 2019-01-22 | End: 2019-01-22

## 2019-01-21 RX ORDER — TRAMADOL HYDROCHLORIDE 50 MG/1
50 TABLET ORAL EVERY 8 HOURS
Qty: 0 | Refills: 0 | Status: DISCONTINUED | OUTPATIENT
Start: 2019-01-21 | End: 2019-01-24

## 2019-01-21 RX ORDER — ACETAMINOPHEN 500 MG
975 TABLET ORAL ONCE
Qty: 0 | Refills: 0 | Status: COMPLETED | OUTPATIENT
Start: 2019-01-21 | End: 2019-01-21

## 2019-01-21 RX ORDER — TRAMADOL HYDROCHLORIDE 50 MG/1
50 TABLET ORAL ONCE
Qty: 0 | Refills: 0 | Status: DISCONTINUED | OUTPATIENT
Start: 2019-01-21 | End: 2019-01-21

## 2019-01-21 RX ORDER — ONDANSETRON 8 MG/1
4 TABLET, FILM COATED ORAL EVERY 6 HOURS
Qty: 0 | Refills: 0 | Status: DISCONTINUED | OUTPATIENT
Start: 2019-01-21 | End: 2019-01-24

## 2019-01-21 RX ORDER — SODIUM CHLORIDE 9 MG/ML
1000 INJECTION, SOLUTION INTRAVENOUS
Qty: 0 | Refills: 0 | Status: DISCONTINUED | OUTPATIENT
Start: 2019-01-21 | End: 2019-01-24

## 2019-01-21 RX ORDER — SODIUM CHLORIDE 9 MG/ML
1000 INJECTION, SOLUTION INTRAVENOUS
Qty: 0 | Refills: 0 | Status: DISCONTINUED | OUTPATIENT
Start: 2019-01-21 | End: 2019-01-22

## 2019-01-21 RX ORDER — CHOLECALCIFEROL (VITAMIN D3) 125 MCG
1000 CAPSULE ORAL DAILY
Qty: 0 | Refills: 0 | Status: DISCONTINUED | OUTPATIENT
Start: 2019-01-21 | End: 2019-01-24

## 2019-01-21 RX ORDER — OXYCODONE HYDROCHLORIDE 5 MG/1
5 TABLET ORAL EVERY 4 HOURS
Qty: 0 | Refills: 0 | Status: DISCONTINUED | OUTPATIENT
Start: 2019-01-21 | End: 2019-01-23

## 2019-01-21 RX ORDER — APIXABAN 2.5 MG/1
2.5 TABLET, FILM COATED ORAL EVERY 12 HOURS
Qty: 0 | Refills: 0 | Status: DISCONTINUED | OUTPATIENT
Start: 2019-01-21 | End: 2019-01-24

## 2019-01-21 RX ORDER — POLYETHYLENE GLYCOL 3350 17 G/17G
17 POWDER, FOR SOLUTION ORAL DAILY
Qty: 0 | Refills: 0 | Status: DISCONTINUED | OUTPATIENT
Start: 2019-01-21 | End: 2019-01-24

## 2019-01-21 RX ORDER — PANTOPRAZOLE SODIUM 20 MG/1
40 TABLET, DELAYED RELEASE ORAL ONCE
Qty: 0 | Refills: 0 | Status: COMPLETED | OUTPATIENT
Start: 2019-01-21 | End: 2019-01-21

## 2019-01-21 RX ORDER — CHOLECALCIFEROL (VITAMIN D3) 125 MCG
1 CAPSULE ORAL
Qty: 0 | Refills: 0 | COMMUNITY

## 2019-01-21 RX ORDER — PANTOPRAZOLE SODIUM 20 MG/1
40 TABLET, DELAYED RELEASE ORAL
Qty: 0 | Refills: 0 | Status: DISCONTINUED | OUTPATIENT
Start: 2019-01-21 | End: 2019-01-24

## 2019-01-21 RX ORDER — CEFAZOLIN SODIUM 1 G
2000 VIAL (EA) INJECTION EVERY 8 HOURS
Qty: 0 | Refills: 0 | Status: COMPLETED | OUTPATIENT
Start: 2019-01-21 | End: 2019-01-22

## 2019-01-21 RX ORDER — SENNA PLUS 8.6 MG/1
2 TABLET ORAL AT BEDTIME
Qty: 0 | Refills: 0 | Status: DISCONTINUED | OUTPATIENT
Start: 2019-01-21 | End: 2019-01-24

## 2019-01-21 RX ORDER — ACETAMINOPHEN 500 MG
975 TABLET ORAL EVERY 8 HOURS
Qty: 0 | Refills: 0 | Status: DISCONTINUED | OUTPATIENT
Start: 2019-01-21 | End: 2019-01-24

## 2019-01-21 RX ORDER — SODIUM CHLORIDE 9 MG/ML
1000 INJECTION INTRAMUSCULAR; INTRAVENOUS; SUBCUTANEOUS ONCE
Qty: 0 | Refills: 0 | Status: COMPLETED | OUTPATIENT
Start: 2019-01-22 | End: 2019-01-22

## 2019-01-21 RX ORDER — OXYCODONE HYDROCHLORIDE 5 MG/1
10 TABLET ORAL EVERY 4 HOURS
Qty: 0 | Refills: 0 | Status: DISCONTINUED | OUTPATIENT
Start: 2019-01-21 | End: 2019-01-23

## 2019-01-21 RX ORDER — L.ACIDOPH/B.ANIMALIS/B.LONGUM 15B CELL
1 CAPSULE ORAL
Qty: 0 | Refills: 0 | COMMUNITY

## 2019-01-21 RX ADMIN — HYDROMORPHONE HYDROCHLORIDE 0.5 MILLIGRAM(S): 2 INJECTION INTRAMUSCULAR; INTRAVENOUS; SUBCUTANEOUS at 23:45

## 2019-01-21 RX ADMIN — Medication 100 MILLIGRAM(S): at 23:06

## 2019-01-21 RX ADMIN — OXYCODONE HYDROCHLORIDE 5 MILLIGRAM(S): 5 TABLET ORAL at 21:16

## 2019-01-21 RX ADMIN — HYDROMORPHONE HYDROCHLORIDE 0.5 MILLIGRAM(S): 2 INJECTION INTRAMUSCULAR; INTRAVENOUS; SUBCUTANEOUS at 23:10

## 2019-01-21 RX ADMIN — TRAMADOL HYDROCHLORIDE 50 MILLIGRAM(S): 50 TABLET ORAL at 21:43

## 2019-01-21 RX ADMIN — TRAMADOL HYDROCHLORIDE 50 MILLIGRAM(S): 50 TABLET ORAL at 13:35

## 2019-01-21 RX ADMIN — Medication 975 MILLIGRAM(S): at 13:33

## 2019-01-21 RX ADMIN — SODIUM CHLORIDE 30 MILLILITER(S): 9 INJECTION, SOLUTION INTRAVENOUS at 13:31

## 2019-01-21 RX ADMIN — Medication 975 MILLIGRAM(S): at 21:43

## 2019-01-21 RX ADMIN — Medication 100 MILLIGRAM(S): at 21:43

## 2019-01-21 RX ADMIN — OXYCODONE HYDROCHLORIDE 5 MILLIGRAM(S): 5 TABLET ORAL at 20:43

## 2019-01-21 RX ADMIN — APIXABAN 2.5 MILLIGRAM(S): 2.5 TABLET, FILM COATED ORAL at 23:07

## 2019-01-21 RX ADMIN — PANTOPRAZOLE SODIUM 40 MILLIGRAM(S): 20 TABLET, DELAYED RELEASE ORAL at 13:34

## 2019-01-21 RX ADMIN — SODIUM CHLORIDE 75 MILLILITER(S): 9 INJECTION, SOLUTION INTRAVENOUS at 20:44

## 2019-01-21 RX ADMIN — SODIUM CHLORIDE 3 MILLILITER(S): 9 INJECTION INTRAMUSCULAR; INTRAVENOUS; SUBCUTANEOUS at 21:44

## 2019-01-21 NOTE — BRIEF OPERATIVE NOTE - PROCEDURE
<<-----Click on this checkbox to enter Procedure Total knee arthroplasty  01/21/2019    Active  [id number]

## 2019-01-21 NOTE — PROGRESS NOTE ADULT - SUBJECTIVE AND OBJECTIVE BOX
ORTHO POST OP CHECK    S: Pt seen and examined. Doing well postoperatively. Pain well controlled. Denies N/V/CP/SOB.       O:   PE:  Gen: NAD, laying comfortably in bed  Resp: Unlabored breathing  MSK: RLE: Dressing c/d/i in KI          +EHL/FHL/TA/Gas/SOl          +DP/SP/Carissa/Saph           2+DP                          12.3   9.76  )-----------( 303      ( 21 Jan 2019 19:00 )             38.2     01-21    140  |  107  |  13  ----------------------------<  107<H>  4.5   |  21<L>  |  0.67    Ca    8.8      21 Jan 2019 19:00        Vital Signs Last 24 Hrs  T(C): 36.4 (21 Jan 2019 21:27), Max: 36.4 (21 Jan 2019 12:57)  T(F): 97.6 (21 Jan 2019 21:27), Max: 97.6 (21 Jan 2019 12:57)  HR: 65 (21 Jan 2019 21:27) (59 - 71)  BP: 132/68 (21 Jan 2019 21:27) (111/70 - 132/68)  BP(mean): 91 (21 Jan 2019 20:30) (74 - 92)  RR: 16 (21 Jan 2019 21:27) (11 - 18)  SpO2: 100% (21 Jan 2019 21:27) (98% - 100%)  I&O's Summary    21 Jan 2019 07:01  -  21 Jan 2019 23:35  --------------------------------------------------------  IN: 550 mL / OUT: 500 mL / NET: 50 mL        A/P: 63F s/p R TKA POD0    -Neuro: Multimodal pain control  -Resp: IS  -GI: reg diet  -MSK: OOB, WBAT, PT/OT  -Heme: DVT PPx: eliquis    Ortho

## 2019-01-22 ENCOUNTER — TRANSCRIPTION ENCOUNTER (OUTPATIENT)
Age: 64
End: 2019-01-22

## 2019-01-22 DIAGNOSIS — K21.9 GASTRO-ESOPHAGEAL REFLUX DISEASE WITHOUT ESOPHAGITIS: ICD-10-CM

## 2019-01-22 DIAGNOSIS — M17.11 UNILATERAL PRIMARY OSTEOARTHRITIS, RIGHT KNEE: ICD-10-CM

## 2019-01-22 LAB
ANION GAP SERPL CALC-SCNC: 9 MMO/L — SIGNIFICANT CHANGE UP (ref 7–14)
BUN SERPL-MCNC: 10 MG/DL — SIGNIFICANT CHANGE UP (ref 7–23)
CALCIUM SERPL-MCNC: 8.3 MG/DL — LOW (ref 8.4–10.5)
CHLORIDE SERPL-SCNC: 104 MMOL/L — SIGNIFICANT CHANGE UP (ref 98–107)
CO2 SERPL-SCNC: 24 MMOL/L — SIGNIFICANT CHANGE UP (ref 22–31)
CREAT SERPL-MCNC: 0.58 MG/DL — SIGNIFICANT CHANGE UP (ref 0.5–1.3)
GLUCOSE SERPL-MCNC: 178 MG/DL — HIGH (ref 70–99)
HCT VFR BLD CALC: 32.4 % — LOW (ref 34.5–45)
HGB BLD-MCNC: 10.5 G/DL — LOW (ref 11.5–15.5)
MCHC RBC-ENTMCNC: 29.9 PG — SIGNIFICANT CHANGE UP (ref 27–34)
MCHC RBC-ENTMCNC: 32.4 % — SIGNIFICANT CHANGE UP (ref 32–36)
MCV RBC AUTO: 92.3 FL — SIGNIFICANT CHANGE UP (ref 80–100)
NRBC # FLD: 0 K/UL — LOW (ref 25–125)
PLATELET # BLD AUTO: 280 K/UL — SIGNIFICANT CHANGE UP (ref 150–400)
PMV BLD: 9.1 FL — SIGNIFICANT CHANGE UP (ref 7–13)
POTASSIUM SERPL-MCNC: 4.8 MMOL/L — SIGNIFICANT CHANGE UP (ref 3.5–5.3)
POTASSIUM SERPL-SCNC: 4.8 MMOL/L — SIGNIFICANT CHANGE UP (ref 3.5–5.3)
RBC # BLD: 3.51 M/UL — LOW (ref 3.8–5.2)
RBC # FLD: 12.9 % — SIGNIFICANT CHANGE UP (ref 10.3–14.5)
SODIUM SERPL-SCNC: 137 MMOL/L — SIGNIFICANT CHANGE UP (ref 135–145)
WBC # BLD: 11.07 K/UL — HIGH (ref 3.8–10.5)
WBC # FLD AUTO: 11.07 K/UL — HIGH (ref 3.8–10.5)

## 2019-01-22 PROCEDURE — 99223 1ST HOSP IP/OBS HIGH 75: CPT | Mod: AI

## 2019-01-22 RX ADMIN — SODIUM CHLORIDE 3 MILLILITER(S): 9 INJECTION INTRAMUSCULAR; INTRAVENOUS; SUBCUTANEOUS at 05:09

## 2019-01-22 RX ADMIN — OXYCODONE HYDROCHLORIDE 5 MILLIGRAM(S): 5 TABLET ORAL at 02:55

## 2019-01-22 RX ADMIN — APIXABAN 2.5 MILLIGRAM(S): 2.5 TABLET, FILM COATED ORAL at 23:01

## 2019-01-22 RX ADMIN — SODIUM CHLORIDE 3 MILLILITER(S): 9 INJECTION INTRAMUSCULAR; INTRAVENOUS; SUBCUTANEOUS at 22:57

## 2019-01-22 RX ADMIN — TRAMADOL HYDROCHLORIDE 50 MILLIGRAM(S): 50 TABLET ORAL at 13:22

## 2019-01-22 RX ADMIN — SODIUM CHLORIDE 3 MILLILITER(S): 9 INJECTION INTRAMUSCULAR; INTRAVENOUS; SUBCUTANEOUS at 13:23

## 2019-01-22 RX ADMIN — OXYCODONE HYDROCHLORIDE 10 MILLIGRAM(S): 5 TABLET ORAL at 12:56

## 2019-01-22 RX ADMIN — Medication 1000 UNIT(S): at 11:52

## 2019-01-22 RX ADMIN — Medication 100 MILLIGRAM(S): at 07:17

## 2019-01-22 RX ADMIN — Medication 100 MILLIGRAM(S): at 13:23

## 2019-01-22 RX ADMIN — POLYETHYLENE GLYCOL 3350 17 GRAM(S): 17 POWDER, FOR SOLUTION ORAL at 11:51

## 2019-01-22 RX ADMIN — OXYCODONE HYDROCHLORIDE 10 MILLIGRAM(S): 5 TABLET ORAL at 20:31

## 2019-01-22 RX ADMIN — Medication 100 MILLIGRAM(S): at 23:01

## 2019-01-22 RX ADMIN — OXYCODONE HYDROCHLORIDE 10 MILLIGRAM(S): 5 TABLET ORAL at 16:30

## 2019-01-22 RX ADMIN — OXYCODONE HYDROCHLORIDE 10 MILLIGRAM(S): 5 TABLET ORAL at 06:31

## 2019-01-22 RX ADMIN — OXYCODONE HYDROCHLORIDE 5 MILLIGRAM(S): 5 TABLET ORAL at 01:05

## 2019-01-22 RX ADMIN — TRAMADOL HYDROCHLORIDE 50 MILLIGRAM(S): 50 TABLET ORAL at 23:01

## 2019-01-22 RX ADMIN — TRAMADOL HYDROCHLORIDE 50 MILLIGRAM(S): 50 TABLET ORAL at 05:09

## 2019-01-22 RX ADMIN — Medication 975 MILLIGRAM(S): at 05:09

## 2019-01-22 RX ADMIN — APIXABAN 2.5 MILLIGRAM(S): 2.5 TABLET, FILM COATED ORAL at 11:51

## 2019-01-22 RX ADMIN — OXYCODONE HYDROCHLORIDE 10 MILLIGRAM(S): 5 TABLET ORAL at 07:00

## 2019-01-22 RX ADMIN — PANTOPRAZOLE SODIUM 40 MILLIGRAM(S): 20 TABLET, DELAYED RELEASE ORAL at 07:17

## 2019-01-22 RX ADMIN — OXYCODONE HYDROCHLORIDE 5 MILLIGRAM(S): 5 TABLET ORAL at 02:13

## 2019-01-22 RX ADMIN — Medication 102 MILLIGRAM(S): at 06:31

## 2019-01-22 RX ADMIN — OXYCODONE HYDROCHLORIDE 5 MILLIGRAM(S): 5 TABLET ORAL at 01:37

## 2019-01-22 RX ADMIN — OXYCODONE HYDROCHLORIDE 10 MILLIGRAM(S): 5 TABLET ORAL at 11:51

## 2019-01-22 RX ADMIN — Medication 75 MILLIGRAM(S): at 05:10

## 2019-01-22 RX ADMIN — Medication 975 MILLIGRAM(S): at 13:24

## 2019-01-22 RX ADMIN — Medication 975 MILLIGRAM(S): at 23:01

## 2019-01-22 RX ADMIN — Medication 1 TABLET(S): at 11:51

## 2019-01-22 RX ADMIN — Medication 100 MILLIGRAM(S): at 05:10

## 2019-01-22 RX ADMIN — OXYCODONE HYDROCHLORIDE 10 MILLIGRAM(S): 5 TABLET ORAL at 15:54

## 2019-01-22 RX ADMIN — OXYCODONE HYDROCHLORIDE 10 MILLIGRAM(S): 5 TABLET ORAL at 21:36

## 2019-01-22 RX ADMIN — Medication 75 MILLIGRAM(S): at 18:01

## 2019-01-22 RX ADMIN — SODIUM CHLORIDE 1000 MILLILITER(S): 9 INJECTION INTRAMUSCULAR; INTRAVENOUS; SUBCUTANEOUS at 05:10

## 2019-01-22 NOTE — PROGRESS NOTE ADULT - SUBJECTIVE AND OBJECTIVE BOX
ANESTHESIA POSTOP CHECK    63y Female POSTOP DAY 1 S/P Right knee replacement    Vital Signs Last 24 Hrs  T(C): 36.4 (22 Jan 2019 09:40), Max: 36.7 (22 Jan 2019 01:51)  T(F): 97.6 (22 Jan 2019 09:40), Max: 98 (22 Jan 2019 01:51)  HR: 70 (22 Jan 2019 09:40) (59 - 78)  BP: 112/58 (22 Jan 2019 09:40) (97/48 - 132/68)  BP(mean): 91 (21 Jan 2019 20:30) (74 - 92)  RR: 18 (22 Jan 2019 09:40) (11 - 18)  SpO2: 99% (22 Jan 2019 09:40) (98% - 100%)  I&O's Summary    21 Jan 2019 07:01  -  22 Jan 2019 07:00  --------------------------------------------------------  IN: 550 mL / OUT: 1550 mL / NET: -1000 mL        [x ] NO APPARENT ANESTHESIA COMPLICATIONS      Comments:

## 2019-01-22 NOTE — PHYSICAL THERAPY INITIAL EVALUATION ADULT - GAIT DEVIATIONS NOTED, PT EVAL
decreased stride length/decreased step length/decreased velocity of limb motion/decreased weight-shifting ability/increased time in double stance/decreased felix

## 2019-01-22 NOTE — DISCHARGE NOTE ADULT - NS AS ACTIVITY OBS
Walking-Outdoors allowed/Walking-Indoors allowed/Stairs allowed/Showering allowed/No Heavy lifting/straining/Do not make important decisions/Do not drive or operate machinery

## 2019-01-22 NOTE — PHYSICAL THERAPY INITIAL EVALUATION ADULT - RANGE OF MOTION EXAMINATION, REHAB EVAL
Right hip/ankle WFL, Right knee flexion ROM 0-30 degrees/Left LE ROM was WFL (within functional limits)/bilateral upper extremity ROM was WFL (within functional limits)

## 2019-01-22 NOTE — OCCUPATIONAL THERAPY INITIAL EVALUATION ADULT - PRECAUTIONS/LIMITATIONS, REHAB EVAL
surgical precautions/fall precautions/Per Ortho PA Leisa, Right Knee Immobilizer- only when ambulating, may discharge when patient able to actively straight leg raise. Pt is unable to perform Right straight leg raise.

## 2019-01-22 NOTE — CONSULT NOTE ADULT - ASSESSMENT
63 y.o. Female w/ hx GERD, Herniated disc of cervical and lumbar spine c/b chronic LBP, B/L osteoarthritis of the knees p/w worsening B/L knee pain R>L x 6 months not improved with steroid injections or physical therapy now s/p right total knee replacement on 01/21/19.

## 2019-01-22 NOTE — CONSULT NOTE ADULT - SUBJECTIVE AND OBJECTIVE BOX
Patient is a 63y old  Female who presents with a chief complaint of R TKA (2019 06:19)      HPI:  63 y.o. Female w/ hx GERD, herniated disc of cervical and lumbar spine presents to [facility] for pre op evaluation with long term h/o constant right knee pain, radiating down to leg. Had P.T, cortisone injections with moderate improvement initially. Now scheduled for right total knee replacement on 19    Pain Symptoms if applicable:             	                         none	   mild         moderate         severe  Pain:	                            0	    1-3	     4-6	         7-10  Location:	  Modifying factors:	  Associated symptoms:	    Function: [ ] Independent  [ ] Assistance  [ ] Total care  [ ] Non-ambulatory    Allergies    aspirin (Other)  bacitracin (Hives)  latex (Rash)    Intolerances        HOME MEDICATIONS: [x ] Reviewed  Vitamin D3 1000 intl units oral tablet: Last Dose Taken:  , 1 tab(s) orally once a day  Multiple Vitamins oral tablet: Last Dose Taken:  , 1 tab(s) orally once a day, last dose   Garlic Oil oral capsule: Last Dose Taken:  , 1  orally once a day, last dose   biotin 1000 mcg oral tablet: Last Dose Taken:  , 1 tab(s) orally once a day  Probiotic Formula oral capsule: Last Dose Taken:  , 1 cap(s) orally once a day    MEDICATIONS  (STANDING):  acetaminophen   Tablet .. 975 milliGRAM(s) Oral every 8 hours  apixaban 2.5 milliGRAM(s) Oral every 12 hours  cholecalciferol 1000 Unit(s) Oral daily  docusate sodium 100 milliGRAM(s) Oral three times a day  lactated ringers. 1000 milliLiter(s) (75 mL/Hr) IV Continuous <Continuous>  multivitamin 1 Tablet(s) Oral daily  pantoprazole    Tablet 40 milliGRAM(s) Oral before breakfast  polyethylene glycol 3350 17 Gram(s) Oral daily  pregabalin 75 milliGRAM(s) Oral every 12 hours  sodium chloride 0.9% Bolus 1000 milliLiter(s) IV Bolus once  sodium chloride 0.9% Bolus 1000 milliLiter(s) IV Bolus once  sodium chloride 0.9% lock flush 3 milliLiter(s) IV Push every 8 hours  traMADol 50 milliGRAM(s) Oral every 8 hours    MEDICATIONS  (PRN):  aluminum hydroxide/magnesium hydroxide/simethicone Suspension 30 milliLiter(s) Oral four times a day PRN Indigestion  HYDROmorphone  Injectable 0.5 milliGRAM(s) IV Push every 4 hours PRN breakthrough pain  magnesium hydroxide Suspension 30 milliLiter(s) Oral daily PRN Constipation  ondansetron Injectable 4 milliGRAM(s) IV Push every 6 hours PRN Nausea and/or Vomiting  oxyCODONE    IR 5 milliGRAM(s) Oral every 4 hours PRN Moderate Pain (4 - 6)  oxyCODONE    IR 10 milliGRAM(s) Oral every 4 hours PRN Severe Pain (7 - 10)  senna 2 Tablet(s) Oral at bedtime PRN Constipation      PAST MEDICAL & SURGICAL HISTORY:  Unilateral primary osteoarthritis, right knee  Carpal tunnel syndrome  Cholecystitis  Meniscal injury: right knee  Shingles  GERD (gastroesophageal reflux disease)  History of arthroscopy of left shoulder: 2018  S/P right knee arthroscopy: x2  S/P : ,   S/P carpal tunnel release: B/L  S/P cholecystectomy  [x ] Reviewed     SOCIAL HISTORY:  Residence: [ ] [facility]  [ ] SNF  [x ] Community  [x ] Substance abuse: none  [ x] Tobacco: none  [x ] Alcohol use: 1-2 drink of wine qmonthly    FAMILY HISTORY:  Family history of GERD (Mother)  Family history of other heart disease (Father): ppm  Family history of diabetes mellitus (DM) (Father)      REVIEW OF SYSTEMS:    CONSTITUTIONAL: No fever, weight loss, or fatigue  EYES: No eye pain, visual disturbances, or discharge  ENMT:  No difficulty hearing, tinnitus, vertigo; No sinus or throat pain  NECK: No pain or stiffness  BREASTS: No pain, masses, or nipple discharge  RESPIRATORY: No cough, wheezing, chills or hemoptysis; No shortness of breath  CARDIOVASCULAR: No chest pain, palpitations, dizziness, or leg swelling  GASTROINTESTINAL: No abdominal or epigastric pain. No nausea, vomiting, or hematemesis; No diarrhea or constipation. No melena or hematochezia.  GENITOURINARY: No dysuria, frequency, hematuria, or incontinence  NEUROLOGICAL: No headaches, memory loss, loss of strength, numbness, or tremors  SKIN: No itching, burning, rashes, or lesions   LYMPH NODES: No enlarged glands  ENDOCRINE: No heat or cold intolerance; No hair loss  MUSCULOSKELETAL: No muscle or back pain  PSYCHIATRIC: No depression, anxiety, mood swings, or difficulty sleeping  HEME/LYMPH: No easy bruising, or bleeding gums  ALLERGY AND IMMUNOLOGIC: No hives or eczema    [x  ] All other ROS negative  [  ] Unable to obtain due to poor mental status    Vital Signs Last 24 Hrs  T(C): 36.4 (2019 09:40), Max: 36.7 (2019 01:51)  T(F): 97.6 (2019 09:40), Max: 98 (2019 01:51)  HR: 70 (2019 09:40) (59 - 78)  BP: 112/58 (2019 09:40) (97/48 - 132/68)  BP(mean): 91 (2019 20:30) (74 - 92)  RR: 18 (2019 09:40) (11 - 18)  SpO2: 99% (2019 09:40) (98% - 100%)    PHYSICAL EXAM:    GENERAL: NAD, well-groomed, well-developed  HEAD:  Atraumatic, Normocephalic  EYES: EOMI, PERRLA, conjunctiva and sclera clear  ENMT: Moist mucous membranes  NECK: Supple, No JVD  RESPIRATORY: Clear to auscultation bilaterally; No rales, rhonchi, wheezing, or rubs  CARDIOVASCULAR: Regular rate and rhythm; No murmurs, rubs, or gallops  GASTROINTESTINAL: Soft, Nontender, Nondistended; Bowel sounds present  GENITOURINARY: Not examined  EXTREMITIES:  2+ Peripheral Pulses, No clubbing, cyanosis, or edema  NERVOUS SYSTEM:  Alert & Oriented X3; Moving all 4 extremities; No gross sensory deficits  HEME/LYMPH: No lymphadenopathy noted  SKIN: No rashes or lesions; Incisions C/D/I    LABS:                        10.5   11.07 )-----------( 280      ( 2019 06:51 )             32.4     -    137  |  104  |  10  ----------------------------<  178<H>  4.8   |  24  |  0.58    Ca    8.3<L>      2019 06:51          CAPILLARY BLOOD GLUCOSE      POCT Blood Glucose.: 94 mg/dL (2019 13:25)      RADIOLOGY & ADDITIONAL STUDIES:    EKG:   Personally Reviewed:  [ ] YES     Imaging: < from: Xray Knee 1 or 2 Views, Right (19 @ 18:22) >  IMPRESSION:    Status post right total knee arthroplasty with expected post surgical   changes.    < end of copied text >    Personally Reviewed:  [x ] YES               Consultant(s) notes reviewed:    Care Discussed with Consultant(s)/Other Providers: ortho Patient is a 63y old  Female who presents with a chief complaint of R TKA (2019 06:19)      HPI:  63 y.o. Female w/ hx GERD, Herniated disc of cervical and lumbar spine c/b chronic LBP, B/L osteoarthritis of the knees p/w worsening B/L knee pain R>L x 6 months not improved with steroid injections or physical therapy now s/p right total knee replacement on 19. Patient says pain controlled with pain meds. She has no new complaints. Denies cp, SOB, abdominal pain, N/V/D     Pain Symptoms if applicable:             	                         none	   mild         moderate         severe  Pain: 5	                            0	    1-3	     4-6	         7-10  Location: right knee	  Modifying factors: movement	  Associated symptoms:	    Function: [ x] Independent  [ ] Assistance  [ ] Total care  [ ] Non-ambulatory    Allergies    aspirin (Other)  bacitracin (Hives)  latex (Rash)    Intolerances        HOME MEDICATIONS: [x ] Reviewed  Vitamin D3 1000 intl units oral tablet: Last Dose Taken:  , 1 tab(s) orally once a day  Multiple Vitamins oral tablet: Last Dose Taken:  , 1 tab(s) orally once a day, last dose   Garlic Oil oral capsule: Last Dose Taken:  , 1  orally once a day, last dose   biotin 1000 mcg oral tablet: Last Dose Taken:  , 1 tab(s) orally once a day  Probiotic Formula oral capsule: Last Dose Taken:  , 1 cap(s) orally once a day    MEDICATIONS  (STANDING):  acetaminophen   Tablet .. 975 milliGRAM(s) Oral every 8 hours  apixaban 2.5 milliGRAM(s) Oral every 12 hours  cholecalciferol 1000 Unit(s) Oral daily  docusate sodium 100 milliGRAM(s) Oral three times a day  lactated ringers. 1000 milliLiter(s) (75 mL/Hr) IV Continuous <Continuous>  multivitamin 1 Tablet(s) Oral daily  pantoprazole    Tablet 40 milliGRAM(s) Oral before breakfast  polyethylene glycol 3350 17 Gram(s) Oral daily  pregabalin 75 milliGRAM(s) Oral every 12 hours  sodium chloride 0.9% Bolus 1000 milliLiter(s) IV Bolus once  sodium chloride 0.9% Bolus 1000 milliLiter(s) IV Bolus once  sodium chloride 0.9% lock flush 3 milliLiter(s) IV Push every 8 hours  traMADol 50 milliGRAM(s) Oral every 8 hours    MEDICATIONS  (PRN):  aluminum hydroxide/magnesium hydroxide/simethicone Suspension 30 milliLiter(s) Oral four times a day PRN Indigestion  HYDROmorphone  Injectable 0.5 milliGRAM(s) IV Push every 4 hours PRN breakthrough pain  magnesium hydroxide Suspension 30 milliLiter(s) Oral daily PRN Constipation  ondansetron Injectable 4 milliGRAM(s) IV Push every 6 hours PRN Nausea and/or Vomiting  oxyCODONE    IR 5 milliGRAM(s) Oral every 4 hours PRN Moderate Pain (4 - 6)  oxyCODONE    IR 10 milliGRAM(s) Oral every 4 hours PRN Severe Pain (7 - 10)  senna 2 Tablet(s) Oral at bedtime PRN Constipation      PAST MEDICAL & SURGICAL HISTORY:  Unilateral primary osteoarthritis, right knee  Carpal tunnel syndrome  Cholecystitis  Meniscal injury: right knee  Shingles  GERD (gastroesophageal reflux disease)  History of arthroscopy of left shoulder: 2018  S/P right knee arthroscopy: x2  S/P : ,   S/P carpal tunnel release: B/L  S/P cholecystectomy  [x ] Reviewed     SOCIAL HISTORY:  Residence: [ ] [facility]  [ ] [facility]  [x ] Community  [x ] Substance abuse: none  [ x] Tobacco: none  [x ] Alcohol use: 1-2 drink of wine qmonthly    FAMILY HISTORY:  Family history of GERD (Mother)  Family history of other heart disease (Father): ppm  Family history of diabetes mellitus (DM) (Father)      REVIEW OF SYSTEMS:    CONSTITUTIONAL: No fever, weight loss, or fatigue  EYES: No eye pain, visual disturbances, or discharge  ENMT:  No difficulty hearing, tinnitus, vertigo; No sinus or throat pain  NECK: No pain or stiffness  BREASTS: No pain, masses, or nipple discharge  RESPIRATORY: No cough, wheezing, chills or hemoptysis; No shortness of breath  CARDIOVASCULAR: No chest pain, palpitations, dizziness, or leg swelling  GASTROINTESTINAL: No abdominal or epigastric pain. No nausea, vomiting, or hematemesis; No diarrhea or constipation. No melena or hematochezia.  GENITOURINARY: No dysuria, frequency, hematuria, or incontinence  NEUROLOGICAL: No headaches, memory loss, loss of strength, numbness, or tremors  SKIN: No itching, burning, rashes, or lesions   LYMPH NODES: No enlarged glands  ENDOCRINE: No heat or cold intolerance; No hair loss  MUSCULOSKELETAL: Right knee pain  PSYCHIATRIC: No depression, anxiety, mood swings, or difficulty sleeping  HEME/LYMPH: No easy bruising, or bleeding gums  ALLERGY AND IMMUNOLOGIC: No hives or eczema    [x  ] All other ROS negative  [  ] Unable to obtain due to poor mental status    Vital Signs Last 24 Hrs  T(C): 36.4 (2019 09:40), Max: 36.7 (2019 01:51)  T(F): 97.6 (2019 09:40), Max: 98 (2019 01:51)  HR: 70 (2019 09:40) (59 - 78)  BP: 112/58 (2019 09:40) (97/48 - 132/68)  BP(mean): 91 (2019 20:30) (74 - 92)  RR: 18 (2019 09:40) (11 - 18)  SpO2: 99% (2019 09:40) (98% - 100%)    PHYSICAL EXAM:    GENERAL: NAD, well-groomed, well-developed  HEAD:  Atraumatic, Normocephalic  EYES: EOMI, PERRLA, conjunctiva and sclera clear  ENMT: Moist mucous membranes  NECK: Supple, No JVD  RESPIRATORY: Clear to auscultation bilaterally; No rales, rhonchi, wheezing, or rubs  CARDIOVASCULAR: Regular rate and rhythm; No murmurs, rubs, or gallops  GASTROINTESTINAL: Soft, Nontender, Nondistended; Bowel sounds present  GENITOURINARY: Not examined  EXTREMITIES:  Right knee bandage. 2+ Peripheral Pulses, No clubbing, cyanosis, or edema  NERVOUS SYSTEM:  Alert & Oriented X3; Moving all 4 extremities; No gross sensory deficits  HEME/LYMPH: No lymphadenopathy noted  SKIN: No rashes or lesions; Incisions C/D/I    LABS:                        10.5   11.07 )-----------( 280      ( 2019 06:51 )             32.4     -    137  |  104  |  10  ----------------------------<  178<H>  4.8   |  24  |  0.58    Ca    8.3<L>      2019 06:51          CAPILLARY BLOOD GLUCOSE      POCT Blood Glucose.: 94 mg/dL (2019 13:25)      RADIOLOGY & ADDITIONAL STUDIES:    EKG:   Personally Reviewed:  [ ] YES     Imaging: < from: Xray Knee 1 or 2 Views, Right (19 @ 18:22) >  IMPRESSION:    Status post right total knee arthroplasty with expected post surgical   changes.    < end of copied text >    Personally Reviewed:  [x ] YES               Consultant(s) notes reviewed:    Care Discussed with Consultant(s)/Other Providers: ortho

## 2019-01-22 NOTE — DISCHARGE NOTE ADULT - PLAN OF CARE
Surgical site healing.  Pain control: Pain medication may cause drowsiness. Do not drive or operate heavy machinery with use opioid pain medication.  Physical therapy: For continued improvement in range of motion and improvement in activities of daily living. Pt is a 68 y/o female s/p right total knee arthoplasty with Dr. Hernandez on 1/21/19 without any intraoperative complications.  Pt is doing well and stable for discharge.  Pt is tolerating physical therapy: WBAT with cane/walker if needed, gait training.  Leave dressing on until post op office visit (POD#14). Have sutures/staples removed POD#14 if present.  Pt is on Eliquis for blood clot prevention. Follow up with Dr. Hernandez in two weeks.  Please follow up with your PMD for continuity of care and management as medications may have changed.  Notify ortho with any questions

## 2019-01-22 NOTE — OCCUPATIONAL THERAPY INITIAL EVALUATION ADULT - BED MOBILITY/TRANSFERS, PREVIOUS LEVEL OF FUNCTION, OT EVAL
needs device/Pt. reports she has been using a cane for about 1 month prior to hospitalization./independent

## 2019-01-22 NOTE — PROGRESS NOTE ADULT - SUBJECTIVE AND OBJECTIVE BOX
Patient seen and examined. C/o R knee pain. Has not yet been OOB. No acute events overnight.     Vital Signs Last 24 Hrs  T(C): 36.5 (22 Jan 2019 05:04), Max: 36.7 (22 Jan 2019 01:51)  T(F): 97.7 (22 Jan 2019 05:04), Max: 98 (22 Jan 2019 01:51)  HR: 78 (22 Jan 2019 05:04) (59 - 78)  BP: 105/59 (22 Jan 2019 05:04) (97/48 - 132/68)  BP(mean): 91 (21 Jan 2019 20:30) (74 - 92)  RR: 16 (22 Jan 2019 05:04) (11 - 18)  SpO2: 100% (22 Jan 2019 05:04) (98% - 100%)    PE:  Gen: NAD, laying comfortably in bed  Resp: Unlabored breathing  MSK: RLE: Aquacel dressing C/D/I          +EHL/FHL/TA/Gas/SOl          +DP/SP/Carissa/Saph           2+DP    A/P: 63F s/p R TKA   - WBAT RLE  - PT/OOB  - Pain control  - On Eliquis for DVT ppx  - Dispo planning

## 2019-01-22 NOTE — DISCHARGE NOTE ADULT - MEDICATION SUMMARY - MEDICATIONS TO STOP TAKING
I will STOP taking the medications listed below when I get home from the hospital:    Garlic Oil oral capsule  -- 1  by mouth once a day; last dose 01/13    biotin 1000 mcg oral tablet  -- 1 tab(s) by mouth once a day

## 2019-01-22 NOTE — DISCHARGE NOTE ADULT - CARE PROVIDER_API CALL
Jean Hernandez), Orthopaedic Surgery  [address] [address]  Phone: [phone]  Fax: [phone]

## 2019-01-22 NOTE — CONSULT NOTE ADULT - PROBLEM SELECTOR RECOMMENDATION 9
s/p right total knee replacement on 01/21/19.  management as per ortho  pain control with tylenol/oxy IR/dilaudid prn  encouraged incentive spirometry and PT  apixaban  for DVT ppx

## 2019-01-22 NOTE — OCCUPATIONAL THERAPY INITIAL EVALUATION ADULT - GENERAL OBSERVATIONS, REHAB EVAL
Pt. received semisupine in bed. No acute distress. Patient agreed to evaluation from Occupational Therapist. +Clean dry intact dressing to Right Knee, +Heplock. Daughter present bedside.

## 2019-01-22 NOTE — OCCUPATIONAL THERAPY INITIAL EVALUATION ADULT - PERTINENT HX OF CURRENT PROBLEM, REHAB EVAL
Pt is a 63 year old female with hx of OA (osteoarthritis) of right knee. Pt is now s/p Right total knee arthroplasty on 1/21/19.

## 2019-01-22 NOTE — OCCUPATIONAL THERAPY INITIAL EVALUATION ADULT - LIVES WITH, PROFILE
Pt. reports she lives with her  in a house with 10 steps to enter. Once inside, pt. reports she has full flight of steps to negotiate to 2nd floor where main bedroom and bathroom are located. Per pt., she has a bathtub in her bathroom.

## 2019-01-22 NOTE — PHYSICAL THERAPY INITIAL EVALUATION ADULT - ADDITIONAL COMMENTS
Patient reports she lives with her  in a private house, 10 steps to enter home, and multiple steps within. Patient reports she was previously independent in all ADLs and at baseline does not use an assistive device for ambulation, however used a cane over the last month secondary to pain.    Patient was left semi-supine in bed as found, all lines/tubes intact and call interiano within reach, LISA figueredo

## 2019-01-22 NOTE — PHYSICAL THERAPY INITIAL EVALUATION ADULT - PATIENT PROFILE REVIEW, REHAB EVAL
yes/PT orders received-->out of bed to chair . Consult with LISA DAMIAN-->pt OK to participate in PT evaluation.

## 2019-01-22 NOTE — DISCHARGE NOTE ADULT - MEDICATION SUMMARY - MEDICATIONS TO TAKE
I will START or STAY ON the medications listed below when I get home from the hospital:    traMADol 50 mg oral tablet  -- 1 tab(s) by mouth every 8 hours  -- Indication: For Pain    oxyCODONE 5 mg oral tablet  -- 1 tab(s) by mouth every 4 hours, As needed, Moderate Pain (4 - 6)  -- Indication: For Pain    oxyCODONE 10 mg oral tablet  -- 1 tab(s) by mouth every 4 hours, As needed, Severe Pain (7 - 10)  -- Indication: For Pain    apixaban 2.5 mg oral tablet  -- 1 tab(s) by mouth every 12 hours  -- Indication: For blood clot prevention    gabapentin 100 mg oral capsule  -- 1 cap(s) by mouth 3 times a day  -- Indication: For Pain    docusate sodium 100 mg oral capsule  -- 1 cap(s) by mouth 3 times a day  -- Indication: For stool softener    senna oral tablet  -- 2 tab(s) by mouth once a day (at bedtime), As needed, Constipation  -- Indication: For laxative    Probiotic Formula oral capsule  -- 1 cap(s) by mouth once a day  -- Indication: For home med    pantoprazole 40 mg oral delayed release tablet  -- 1 tab(s) by mouth once a day (before a meal)  -- Indication: For GI protection    Multiple Vitamins oral tablet  -- 1 tab(s) by mouth once a day; last dose 01/13  -- Indication: For home med    Vitamin D3 1000 intl units oral tablet  -- 1 tab(s) by mouth once a day  -- Indication: For home med I will START or STAY ON the medications listed below when I get home from the hospital:    traMADol 50 mg oral tablet  -- 1 tab(s) by mouth every 8 hours  -- Indication: For Pain    oxyCODONE 5 mg oral tablet  -- 1 tab(s) by mouth every 3 hours, As needed, Moderate Pain (4 - 6)  -- Indication: For Pain    oxyCODONE 10 mg oral tablet  -- 1 tab(s) by mouth every 3 hours, As needed, Severe Pain (7 - 10)  -- Indication: For Pain    apixaban 2.5 mg oral tablet  -- 1 tab(s) by mouth every 12 hours  -- Indication: For blood clot prevention    gabapentin 100 mg oral capsule  -- 1 cap(s) by mouth 3 times a day  -- Indication: For Pain    docusate sodium 100 mg oral capsule  -- 1 cap(s) by mouth 3 times a day  -- Indication: For stool softener    senna oral tablet  -- 2 tab(s) by mouth once a day (at bedtime), As needed, Constipation  -- Indication: For laxative    Probiotic Formula oral capsule  -- 1 cap(s) by mouth once a day  -- Indication: For home med    pantoprazole 40 mg oral delayed release tablet  -- 1 tab(s) by mouth once a day (before a meal)  -- Indication: For GI protection    Multiple Vitamins oral tablet  -- 1 tab(s) by mouth once a day; last dose 01/13  -- Indication: For home med    Vitamin D3 1000 intl units oral tablet  -- 1 tab(s) by mouth once a day  -- Indication: For home med

## 2019-01-22 NOTE — DISCHARGE NOTE ADULT - INSTRUCTIONS
Resume same diet as prior to surgery You have a post-op appointment with Dr. Hernandez on 2/6/19 at 12:45pm. Call the office if you are unable to keep this appointment or need to reschedule. Call your doctor for pain unrelieved by pain medications, for temperature greater than 100.4, for redness/swelling/drainage from your surgical site. Do not remove your dressing; do not submerge dressing in water. Take over the counter stool softeners to prevent constipation, which is a side effect of pain medication.

## 2019-01-22 NOTE — DISCHARGE NOTE ADULT - HOSPITAL COURSE
Pt is a 68 y/o female s/p right total knee arthoplasty with Dr. Hernandez on 1/21/19 without any intraoperative complications.  Pt is doing well and stable for discharge.  Pt is tolerating physical therapy: WBAT with cane/walker if needed, gait training.  Leave dressing on until post op office visit (POD#14). Have sutures/staples removed POD#14 if present.  Pt is on Eliquis for blood clot prevention. Follow up with Dr. Hernandez in two weeks.  Please follow up with your PMD for continuity of care and management as medications may have changed.  Notify ortho with any questions

## 2019-01-22 NOTE — OCCUPATIONAL THERAPY INITIAL EVALUATION ADULT - MD ORDER
Occupational Therapy (OT) to evaluate and treat. Occupational Therapy (OT) to evaluate and treat. Out of Bed to Chair. Per LISA Wilson, pt is okay to participate in OT evaluation and perform activity as tolerated.

## 2019-01-22 NOTE — PHYSICAL THERAPY INITIAL EVALUATION ADULT - IMPAIRMENTS CONTRIBUTING TO GAIT DEVIATIONS, PT EVAL
decreased ROM/decreased strength/pain/impaired balance/impaired postural control/RN Rowena DAMIAN aware of pain

## 2019-01-22 NOTE — DISCHARGE NOTE ADULT - PATIENT PORTAL LINK FT
You can access the [facility] Portal, offered by [facility], by registering with the following website: [email or web address]

## 2019-01-22 NOTE — DISCHARGE NOTE ADULT - NS AS DC FOLLOWUP STROKE INST
Care Notes/Discharge Instructions on Total Knee Arthroplasty, Knee Exercises, discharge medications, Side Effects of Medications pamphlet

## 2019-01-22 NOTE — PHYSICAL THERAPY INITIAL EVALUATION ADULT - GENERAL OBSERVATIONS, REHAB EVAL
Patient was received semi-supine in bed, c/o pain but willing to participate in PT evaluation. +ace wrap to right LE.

## 2019-01-22 NOTE — DISCHARGE NOTE ADULT - CONDITIONS AT DISCHARGE
Patient is stable. Pain managed. Ambulatory with walker. Knee dressing dry and intact. Tolerating diet, voiding.

## 2019-01-22 NOTE — DISCHARGE NOTE ADULT - CARE PLAN
Principal Discharge DX:	Unilateral primary osteoarthritis, right knee  Goal:	Surgical site healing.  Pain control: Pain medication may cause drowsiness. Do not drive or operate heavy machinery with use opioid pain medication.  Physical therapy: For continued improvement in range of motion and improvement in activities of daily living.  Assessment and plan of treatment:	Pt is a 68 y/o female s/p right total knee arthoplasty with Dr. Hernandez on 1/21/19 without any intraoperative complications.  Pt is doing well and stable for discharge.  Pt is tolerating physical therapy: WBAT with cane/walker if needed, gait training.  Leave dressing on until post op office visit (POD#14). Have sutures/staples removed POD#14 if present.  Pt is on Eliquis for blood clot prevention. Follow up with Dr. Hernandez in two weeks.  Please follow up with your PMD for continuity of care and management as medications may have changed.  Notify ortho with any questions

## 2019-01-23 DIAGNOSIS — D72.829 ELEVATED WHITE BLOOD CELL COUNT, UNSPECIFIED: ICD-10-CM

## 2019-01-23 LAB
HCT VFR BLD CALC: 32.6 % — LOW (ref 34.5–45)
HGB BLD-MCNC: 10.2 G/DL — LOW (ref 11.5–15.5)
MCHC RBC-ENTMCNC: 28.8 PG — SIGNIFICANT CHANGE UP (ref 27–34)
MCHC RBC-ENTMCNC: 31.3 % — LOW (ref 32–36)
MCV RBC AUTO: 92.1 FL — SIGNIFICANT CHANGE UP (ref 80–100)
NRBC # FLD: 0 K/UL — LOW (ref 25–125)
PLATELET # BLD AUTO: 308 K/UL — SIGNIFICANT CHANGE UP (ref 150–400)
PMV BLD: 9.5 FL — SIGNIFICANT CHANGE UP (ref 7–13)
RBC # BLD: 3.54 M/UL — LOW (ref 3.8–5.2)
RBC # FLD: 13.2 % — SIGNIFICANT CHANGE UP (ref 10.3–14.5)
WBC # BLD: 13.44 K/UL — HIGH (ref 3.8–10.5)
WBC # FLD AUTO: 13.44 K/UL — HIGH (ref 3.8–10.5)

## 2019-01-23 PROCEDURE — 99233 SBSQ HOSP IP/OBS HIGH 50: CPT

## 2019-01-23 RX ORDER — OXYCODONE HYDROCHLORIDE 5 MG/1
10 TABLET ORAL
Qty: 0 | Refills: 0 | Status: DISCONTINUED | OUTPATIENT
Start: 2019-01-23 | End: 2019-01-24

## 2019-01-23 RX ORDER — APIXABAN 2.5 MG/1
1 TABLET, FILM COATED ORAL
Qty: 0 | Refills: 0 | COMMUNITY
Start: 2019-01-23

## 2019-01-23 RX ORDER — GABAPENTIN 400 MG/1
1 CAPSULE ORAL
Qty: 0 | Refills: 0 | COMMUNITY

## 2019-01-23 RX ORDER — PANTOPRAZOLE SODIUM 20 MG/1
1 TABLET, DELAYED RELEASE ORAL
Qty: 0 | Refills: 0 | COMMUNITY
Start: 2019-01-23

## 2019-01-23 RX ORDER — OXYCODONE HYDROCHLORIDE 5 MG/1
1 TABLET ORAL
Qty: 0 | Refills: 0 | COMMUNITY
Start: 2019-01-23

## 2019-01-23 RX ORDER — OXYCODONE HYDROCHLORIDE 5 MG/1
5 TABLET ORAL
Qty: 0 | Refills: 0 | Status: DISCONTINUED | OUTPATIENT
Start: 2019-01-23 | End: 2019-01-24

## 2019-01-23 RX ORDER — DOCUSATE SODIUM 100 MG
1 CAPSULE ORAL
Qty: 0 | Refills: 0 | COMMUNITY
Start: 2019-01-23

## 2019-01-23 RX ORDER — TRAMADOL HYDROCHLORIDE 50 MG/1
1 TABLET ORAL
Qty: 0 | Refills: 0 | COMMUNITY
Start: 2019-01-23

## 2019-01-23 RX ORDER — SENNA PLUS 8.6 MG/1
2 TABLET ORAL
Qty: 0 | Refills: 0 | COMMUNITY
Start: 2019-01-23

## 2019-01-23 RX ORDER — DIPHENHYDRAMINE HCL 50 MG
25 CAPSULE ORAL ONCE
Qty: 0 | Refills: 0 | Status: COMPLETED | OUTPATIENT
Start: 2019-01-23 | End: 2019-01-23

## 2019-01-23 RX ADMIN — SODIUM CHLORIDE 3 MILLILITER(S): 9 INJECTION INTRAMUSCULAR; INTRAVENOUS; SUBCUTANEOUS at 05:40

## 2019-01-23 RX ADMIN — SODIUM CHLORIDE 3 MILLILITER(S): 9 INJECTION INTRAMUSCULAR; INTRAVENOUS; SUBCUTANEOUS at 14:19

## 2019-01-23 RX ADMIN — Medication 100 MILLIGRAM(S): at 14:19

## 2019-01-23 RX ADMIN — TRAMADOL HYDROCHLORIDE 50 MILLIGRAM(S): 50 TABLET ORAL at 22:09

## 2019-01-23 RX ADMIN — OXYCODONE HYDROCHLORIDE 10 MILLIGRAM(S): 5 TABLET ORAL at 22:25

## 2019-01-23 RX ADMIN — Medication 975 MILLIGRAM(S): at 21:13

## 2019-01-23 RX ADMIN — TRAMADOL HYDROCHLORIDE 50 MILLIGRAM(S): 50 TABLET ORAL at 05:39

## 2019-01-23 RX ADMIN — Medication 100 MILLIGRAM(S): at 21:13

## 2019-01-23 RX ADMIN — Medication 975 MILLIGRAM(S): at 14:19

## 2019-01-23 RX ADMIN — Medication 1000 UNIT(S): at 12:48

## 2019-01-23 RX ADMIN — Medication 25 MILLIGRAM(S): at 14:38

## 2019-01-23 RX ADMIN — OXYCODONE HYDROCHLORIDE 10 MILLIGRAM(S): 5 TABLET ORAL at 03:47

## 2019-01-23 RX ADMIN — OXYCODONE HYDROCHLORIDE 10 MILLIGRAM(S): 5 TABLET ORAL at 17:41

## 2019-01-23 RX ADMIN — Medication 975 MILLIGRAM(S): at 05:39

## 2019-01-23 RX ADMIN — OXYCODONE HYDROCHLORIDE 10 MILLIGRAM(S): 5 TABLET ORAL at 09:00

## 2019-01-23 RX ADMIN — OXYCODONE HYDROCHLORIDE 10 MILLIGRAM(S): 5 TABLET ORAL at 18:30

## 2019-01-23 RX ADMIN — OXYCODONE HYDROCHLORIDE 10 MILLIGRAM(S): 5 TABLET ORAL at 21:07

## 2019-01-23 RX ADMIN — APIXABAN 2.5 MILLIGRAM(S): 2.5 TABLET, FILM COATED ORAL at 09:24

## 2019-01-23 RX ADMIN — Medication 1 TABLET(S): at 12:48

## 2019-01-23 RX ADMIN — Medication 75 MILLIGRAM(S): at 05:40

## 2019-01-23 RX ADMIN — PANTOPRAZOLE SODIUM 40 MILLIGRAM(S): 20 TABLET, DELAYED RELEASE ORAL at 07:37

## 2019-01-23 RX ADMIN — OXYCODONE HYDROCHLORIDE 10 MILLIGRAM(S): 5 TABLET ORAL at 04:38

## 2019-01-23 RX ADMIN — POLYETHYLENE GLYCOL 3350 17 GRAM(S): 17 POWDER, FOR SOLUTION ORAL at 12:48

## 2019-01-23 RX ADMIN — SODIUM CHLORIDE 3 MILLILITER(S): 9 INJECTION INTRAMUSCULAR; INTRAVENOUS; SUBCUTANEOUS at 21:10

## 2019-01-23 RX ADMIN — OXYCODONE HYDROCHLORIDE 10 MILLIGRAM(S): 5 TABLET ORAL at 08:22

## 2019-01-23 RX ADMIN — Medication 100 MILLIGRAM(S): at 05:40

## 2019-01-23 RX ADMIN — APIXABAN 2.5 MILLIGRAM(S): 2.5 TABLET, FILM COATED ORAL at 21:13

## 2019-01-23 NOTE — PROGRESS NOTE ADULT - SUBJECTIVE AND OBJECTIVE BOX
Patient is a 63y old  Female who presents with a chief complaint of Right TKR (23 Jan 2019 11:08)      SUBJECTIVE / OVERNIGHT EVENTS:  Patient c/o facial pruritus. She says she had chin erythema today which has progressed to B/L chicks and bridge of nose. She has a history of rosacea. Otherwise has no new complaints. Denies cp, SOB, abdominal pain, N/V/D     MEDICATIONS  (STANDING):  acetaminophen   Tablet .. 975 milliGRAM(s) Oral every 8 hours  apixaban 2.5 milliGRAM(s) Oral every 12 hours  cholecalciferol 1000 Unit(s) Oral daily  diphenhydrAMINE   Injectable 25 milliGRAM(s) IV Push once  docusate sodium 100 milliGRAM(s) Oral three times a day  lactated ringers. 1000 milliLiter(s) (75 mL/Hr) IV Continuous <Continuous>  multivitamin 1 Tablet(s) Oral daily  pantoprazole    Tablet 40 milliGRAM(s) Oral before breakfast  polyethylene glycol 3350 17 Gram(s) Oral daily  sodium chloride 0.9% Bolus 1000 milliLiter(s) IV Bolus once  sodium chloride 0.9% Bolus 1000 milliLiter(s) IV Bolus once  sodium chloride 0.9% lock flush 3 milliLiter(s) IV Push every 8 hours  traMADol 50 milliGRAM(s) Oral every 8 hours    MEDICATIONS  (PRN):  aluminum hydroxide/magnesium hydroxide/simethicone Suspension 30 milliLiter(s) Oral four times a day PRN Indigestion  bisacodyl Suppository 10 milliGRAM(s) Rectal daily PRN If no bowel movement by postoperative day #2  HYDROmorphone  Injectable 0.5 milliGRAM(s) IV Push every 4 hours PRN breakthrough pain  magnesium hydroxide Suspension 30 milliLiter(s) Oral daily PRN Constipation  ondansetron Injectable 4 milliGRAM(s) IV Push every 6 hours PRN Nausea and/or Vomiting  oxyCODONE    IR 5 milliGRAM(s) Oral every 3 hours PRN Moderate Pain (4 - 6)  oxyCODONE    IR 10 milliGRAM(s) Oral every 3 hours PRN Severe Pain (7 - 10)  senna 2 Tablet(s) Oral at bedtime PRN Constipation      Vital Signs Last 24 Hrs  T(C): 36.7 (23 Jan 2019 14:21), Max: 37.1 (23 Jan 2019 01:30)  T(F): 98 (23 Jan 2019 14:21), Max: 98.8 (23 Jan 2019 01:30)  HR: 77 (23 Jan 2019 14:21) (67 - 77)  BP: 106/55 (23 Jan 2019 14:21) (105/55 - 123/55)  BP(mean): --  RR: 17 (23 Jan 2019 14:21) (16 - 18)  SpO2: 100% (23 Jan 2019 14:21) (100% - 100%)  CAPILLARY BLOOD GLUCOSE        I&O's Summary    22 Jan 2019 07:01  -  23 Jan 2019 07:00  --------------------------------------------------------  IN: 0 mL / OUT: 1300 mL / NET: -1300 mL        PHYSICAL EXAM:  GENERAL: NAD, well-developed  HEAD:  Atraumatic, Normocephalic  EYES: EOMI, PERRLA, conjunctiva and sclera clear  NECK: Supple, No JVD  CHEST/LUNG: Clear to auscultation bilaterally; No wheeze  HEART: Regular rate and rhythm; No murmurs, rubs, or gallops  ABDOMEN: Soft, Nontender, Nondistended; Bowel sounds present  EXTREMITIES:  Right knee bandage. 2+ Peripheral Pulses, No clubbing, cyanosis, or edema  PSYCH: AAOx3  NEUROLOGY: non-focal  SKIN: well demarcated erythema of the bridge of the nose, chicks and chin. No rashes or lesions    LABS:                        10.2   13.44 )-----------( 308      ( 23 Jan 2019 06:31 )             32.6     01-22    137  |  104  |  10  ----------------------------<  178<H>  4.8   |  24  |  0.58    Ca    8.3<L>      22 Jan 2019 06:51                RADIOLOGY & ADDITIONAL TESTS:    Imaging Personally Reviewed:    Consultant(s) Notes Reviewed:      Care Discussed with Consultants/Other Providers: ortho

## 2019-01-23 NOTE — PROGRESS NOTE ADULT - SUBJECTIVE AND OBJECTIVE BOX
Patient is a 63y old  Female who presents with a chief complaint of elective right total knee arthoplasty on 1/21/19 (22 Jan 2019 18:57)      SUBJECTIVE / OVERNIGHT EVENTS:    MEDICATIONS  (STANDING):  acetaminophen   Tablet .. 975 milliGRAM(s) Oral every 8 hours  apixaban 2.5 milliGRAM(s) Oral every 12 hours  cholecalciferol 1000 Unit(s) Oral daily  docusate sodium 100 milliGRAM(s) Oral three times a day  lactated ringers. 1000 milliLiter(s) (75 mL/Hr) IV Continuous <Continuous>  multivitamin 1 Tablet(s) Oral daily  pantoprazole    Tablet 40 milliGRAM(s) Oral before breakfast  polyethylene glycol 3350 17 Gram(s) Oral daily  pregabalin 75 milliGRAM(s) Oral every 12 hours  sodium chloride 0.9% Bolus 1000 milliLiter(s) IV Bolus once  sodium chloride 0.9% Bolus 1000 milliLiter(s) IV Bolus once  sodium chloride 0.9% lock flush 3 milliLiter(s) IV Push every 8 hours  traMADol 50 milliGRAM(s) Oral every 8 hours    MEDICATIONS  (PRN):  aluminum hydroxide/magnesium hydroxide/simethicone Suspension 30 milliLiter(s) Oral four times a day PRN Indigestion  bisacodyl Suppository 10 milliGRAM(s) Rectal daily PRN If no bowel movement by postoperative day #2  HYDROmorphone  Injectable 0.5 milliGRAM(s) IV Push every 4 hours PRN breakthrough pain  magnesium hydroxide Suspension 30 milliLiter(s) Oral daily PRN Constipation  ondansetron Injectable 4 milliGRAM(s) IV Push every 6 hours PRN Nausea and/or Vomiting  oxyCODONE    IR 5 milliGRAM(s) Oral every 3 hours PRN Moderate Pain (4 - 6)  oxyCODONE    IR 10 milliGRAM(s) Oral every 3 hours PRN Severe Pain (7 - 10)  senna 2 Tablet(s) Oral at bedtime PRN Constipation      Vital Signs Last 24 Hrs  T(C): 36.6 (23 Jan 2019 10:07), Max: 37.1 (23 Jan 2019 01:30)  T(F): 97.9 (23 Jan 2019 10:07), Max: 98.8 (23 Jan 2019 01:30)  HR: 70 (23 Jan 2019 10:07) (67 - 71)  BP: 106/59 (23 Jan 2019 10:07) (105/55 - 123/55)  BP(mean): --  RR: 16 (23 Jan 2019 10:07) (16 - 18)  SpO2: 100% (23 Jan 2019 10:07) (100% - 100%)  CAPILLARY BLOOD GLUCOSE        I&O's Summary    22 Jan 2019 07:01  -  23 Jan 2019 07:00  --------------------------------------------------------  IN: 0 mL / OUT: 1300 mL / NET: -1300 mL        PHYSICAL EXAM:  GENERAL: NAD, well-developed  HEAD:  Atraumatic, Normocephalic  EYES: EOMI, PERRLA, conjunctiva and sclera clear  NECK: Supple, No JVD  CHEST/LUNG: Clear to auscultation bilaterally; No wheeze  HEART: Regular rate and rhythm; No murmurs, rubs, or gallops  ABDOMEN: Soft, Nontender, Nondistended; Bowel sounds present  EXTREMITIES:  2+ Peripheral Pulses, No clubbing, cyanosis, or edema  PSYCH: AAOx3  NEUROLOGY: non-focal  SKIN: No rashes or lesions    LABS:                        10.2   13.44 )-----------( 308      ( 23 Jan 2019 06:31 )             32.6     01-22    137  |  104  |  10  ----------------------------<  178<H>  4.8   |  24  |  0.58    Ca    8.3<L>      22 Jan 2019 06:51                RADIOLOGY & ADDITIONAL TESTS:    Imaging Personally Reviewed:    Consultant(s) Notes Reviewed:      Care Discussed with Consultants/Other Providers: ortho Patient is a 63y old  Female who presents with a chief complaint of elective right total knee arthoplasty on 1/21/19 (22 Jan 2019 18:57)      SUBJECTIVE / OVERNIGHT EVENTS:  Patient has no new complaints. Denies cp, SOB, abdominal pain, N/V/D     MEDICATIONS  (STANDING):  acetaminophen   Tablet .. 975 milliGRAM(s) Oral every 8 hours  apixaban 2.5 milliGRAM(s) Oral every 12 hours  cholecalciferol 1000 Unit(s) Oral daily  docusate sodium 100 milliGRAM(s) Oral three times a day  lactated ringers. 1000 milliLiter(s) (75 mL/Hr) IV Continuous <Continuous>  multivitamin 1 Tablet(s) Oral daily  pantoprazole    Tablet 40 milliGRAM(s) Oral before breakfast  polyethylene glycol 3350 17 Gram(s) Oral daily  pregabalin 75 milliGRAM(s) Oral every 12 hours  sodium chloride 0.9% Bolus 1000 milliLiter(s) IV Bolus once  sodium chloride 0.9% Bolus 1000 milliLiter(s) IV Bolus once  sodium chloride 0.9% lock flush 3 milliLiter(s) IV Push every 8 hours  traMADol 50 milliGRAM(s) Oral every 8 hours    MEDICATIONS  (PRN):  aluminum hydroxide/magnesium hydroxide/simethicone Suspension 30 milliLiter(s) Oral four times a day PRN Indigestion  bisacodyl Suppository 10 milliGRAM(s) Rectal daily PRN If no bowel movement by postoperative day #2  HYDROmorphone  Injectable 0.5 milliGRAM(s) IV Push every 4 hours PRN breakthrough pain  magnesium hydroxide Suspension 30 milliLiter(s) Oral daily PRN Constipation  ondansetron Injectable 4 milliGRAM(s) IV Push every 6 hours PRN Nausea and/or Vomiting  oxyCODONE    IR 5 milliGRAM(s) Oral every 3 hours PRN Moderate Pain (4 - 6)  oxyCODONE    IR 10 milliGRAM(s) Oral every 3 hours PRN Severe Pain (7 - 10)  senna 2 Tablet(s) Oral at bedtime PRN Constipation      Vital Signs Last 24 Hrs  T(C): 36.6 (23 Jan 2019 10:07), Max: 37.1 (23 Jan 2019 01:30)  T(F): 97.9 (23 Jan 2019 10:07), Max: 98.8 (23 Jan 2019 01:30)  HR: 70 (23 Jan 2019 10:07) (67 - 71)  BP: 106/59 (23 Jan 2019 10:07) (105/55 - 123/55)  BP(mean): --  RR: 16 (23 Jan 2019 10:07) (16 - 18)  SpO2: 100% (23 Jan 2019 10:07) (100% - 100%)  CAPILLARY BLOOD GLUCOSE        I&O's Summary    22 Jan 2019 07:01  -  23 Jan 2019 07:00  --------------------------------------------------------  IN: 0 mL / OUT: 1300 mL / NET: -1300 mL        PHYSICAL EXAM:  GENERAL: NAD, well-developed  HEAD:  Atraumatic, Normocephalic  EYES: EOMI, PERRLA, conjunctiva and sclera clear  NECK: Supple, No JVD  CHEST/LUNG: Clear to auscultation bilaterally; No wheeze  HEART: Regular rate and rhythm; No murmurs, rubs, or gallops  ABDOMEN: Soft, Nontender, Nondistended; Bowel sounds present  EXTREMITIES:  Right knee bandage. 2+ Peripheral Pulses, No clubbing, cyanosis, or edema  PSYCH: AAOx3  NEUROLOGY: non-focal  SKIN: No rashes or lesions    LABS:                        10.2   13.44 )-----------( 308      ( 23 Jan 2019 06:31 )             32.6     01-22    137  |  104  |  10  ----------------------------<  178<H>  4.8   |  24  |  0.58    Ca    8.3<L>      22 Jan 2019 06:51                RADIOLOGY & ADDITIONAL TESTS:    Imaging Personally Reviewed:    Consultant(s) Notes Reviewed:      Care Discussed with Consultants/Other Providers: ortho

## 2019-01-23 NOTE — PROGRESS NOTE ADULT - PROBLEM SELECTOR PLAN 1
s/p right total knee replacement on 01/21/19.  management as per ortho  pain controlled with tylenol/oxy IR/dilaudid prn  encouraged incentive spirometry and PT  d/c planning as per primary team  apixaban  for DVT ppx.

## 2019-01-23 NOTE — PROGRESS NOTE ADULT - PROBLEM SELECTOR PLAN 1
start benadryl 25mg IV x 1  If improves with benadryl then most likely allergic rxn; suspect lyrica.  recommend d/c lyrica.  if no improvement with benadryl then most likely acute exacerbation of rosacea due to stress.

## 2019-01-23 NOTE — PROGRESS NOTE ADULT - PROBLEM SELECTOR PLAN 2
s/p right total knee replacement on 01/21/19.  management as per ortho  pain control with tylenol/oxy IR/dilaudid prn  encouraged incentive spirometry and PT  apixaban  for DVT ppx.

## 2019-01-23 NOTE — PROGRESS NOTE ADULT - SUBJECTIVE AND OBJECTIVE BOX
Patient is seen and examined. Denies CP/SOB/DIzziness/N/V/D/HA. Pain is controlled.     Vital Signs Last 24 Hrs  T(C): 37 (23 Jan 2019 05:35), Max: 37.1 (23 Jan 2019 01:30)  T(F): 98.6 (23 Jan 2019 05:35), Max: 98.8 (23 Jan 2019 01:30)  HR: 71 (23 Jan 2019 05:35) (67 - 71)  BP: 109/50 (23 Jan 2019 05:35) (105/55 - 123/55)  BP(mean): --  RR: 16 (23 Jan 2019 05:35) (16 - 18)  SpO2: 100% (23 Jan 2019 05:35) (99% - 100%)    Gen: NAD    RLE: Dressing C/D/I. HV is in place.   Motor intact 5/5 RLE. Sensation is grossly intact in the BL LE. Compartments are soft, extremities are warm. DP 1+ RLE.    Labs:                        10.5   11.07 )-----------( 280      ( 22 Jan 2019 06:51 )             32.4       01-22    137  |  104  |  10  ----------------------------<  178<H>  4.8   |  24  |  0.58    Ca    8.3<L>      22 Jan 2019 06:51        A/P: Patient is a 63y Female s/p R total knee arthroplasty, POD # 2    - Pain control / Analgesia  - Anticoagulation Eliquis  -PT/OT  -WBAT  -OOB  -Inc Spirometry  -Foot Pumps  -Rehab  -F/U AM Labs  -Notify Ortho with any questions

## 2019-01-24 VITALS
TEMPERATURE: 98 F | HEART RATE: 80 BPM | SYSTOLIC BLOOD PRESSURE: 127 MMHG | OXYGEN SATURATION: 99 % | RESPIRATION RATE: 16 BRPM | DIASTOLIC BLOOD PRESSURE: 73 MMHG

## 2019-01-24 DIAGNOSIS — T78.3XXA ANGIONEUROTIC EDEMA, INITIAL ENCOUNTER: ICD-10-CM

## 2019-01-24 LAB
HCT VFR BLD CALC: 31.7 % — LOW (ref 34.5–45)
HGB BLD-MCNC: 9.7 G/DL — LOW (ref 11.5–15.5)
MCHC RBC-ENTMCNC: 28.5 PG — SIGNIFICANT CHANGE UP (ref 27–34)
MCHC RBC-ENTMCNC: 30.6 % — LOW (ref 32–36)
MCV RBC AUTO: 93.2 FL — SIGNIFICANT CHANGE UP (ref 80–100)
NRBC # FLD: 0 K/UL — LOW (ref 25–125)
PLATELET # BLD AUTO: 298 K/UL — SIGNIFICANT CHANGE UP (ref 150–400)
PMV BLD: 9.5 FL — SIGNIFICANT CHANGE UP (ref 7–13)
RBC # BLD: 3.4 M/UL — LOW (ref 3.8–5.2)
RBC # FLD: 13.5 % — SIGNIFICANT CHANGE UP (ref 10.3–14.5)
WBC # BLD: 11.42 K/UL — HIGH (ref 3.8–10.5)
WBC # FLD AUTO: 11.42 K/UL — HIGH (ref 3.8–10.5)

## 2019-01-24 PROCEDURE — 99238 HOSP IP/OBS DSCHRG MGMT 30/<: CPT

## 2019-01-24 PROCEDURE — 99233 SBSQ HOSP IP/OBS HIGH 50: CPT

## 2019-01-24 RX ADMIN — OXYCODONE HYDROCHLORIDE 10 MILLIGRAM(S): 5 TABLET ORAL at 00:07

## 2019-01-24 RX ADMIN — PANTOPRAZOLE SODIUM 40 MILLIGRAM(S): 20 TABLET, DELAYED RELEASE ORAL at 05:29

## 2019-01-24 RX ADMIN — SODIUM CHLORIDE 3 MILLILITER(S): 9 INJECTION INTRAMUSCULAR; INTRAVENOUS; SUBCUTANEOUS at 13:16

## 2019-01-24 RX ADMIN — OXYCODONE HYDROCHLORIDE 10 MILLIGRAM(S): 5 TABLET ORAL at 13:30

## 2019-01-24 RX ADMIN — Medication 1 TABLET(S): at 13:20

## 2019-01-24 RX ADMIN — Medication 100 MILLIGRAM(S): at 13:20

## 2019-01-24 RX ADMIN — Medication 975 MILLIGRAM(S): at 05:29

## 2019-01-24 RX ADMIN — OXYCODONE HYDROCHLORIDE 10 MILLIGRAM(S): 5 TABLET ORAL at 12:33

## 2019-01-24 RX ADMIN — TRAMADOL HYDROCHLORIDE 50 MILLIGRAM(S): 50 TABLET ORAL at 13:20

## 2019-01-24 RX ADMIN — OXYCODONE HYDROCHLORIDE 10 MILLIGRAM(S): 5 TABLET ORAL at 00:43

## 2019-01-24 RX ADMIN — Medication 100 MILLIGRAM(S): at 05:29

## 2019-01-24 RX ADMIN — SODIUM CHLORIDE 3 MILLILITER(S): 9 INJECTION INTRAMUSCULAR; INTRAVENOUS; SUBCUTANEOUS at 05:28

## 2019-01-24 RX ADMIN — TRAMADOL HYDROCHLORIDE 50 MILLIGRAM(S): 50 TABLET ORAL at 05:29

## 2019-01-24 RX ADMIN — POLYETHYLENE GLYCOL 3350 17 GRAM(S): 17 POWDER, FOR SOLUTION ORAL at 13:20

## 2019-01-24 RX ADMIN — APIXABAN 2.5 MILLIGRAM(S): 2.5 TABLET, FILM COATED ORAL at 10:17

## 2019-01-24 RX ADMIN — Medication 1000 UNIT(S): at 13:20

## 2019-01-24 RX ADMIN — OXYCODONE HYDROCHLORIDE 10 MILLIGRAM(S): 5 TABLET ORAL at 08:41

## 2019-01-24 RX ADMIN — Medication 975 MILLIGRAM(S): at 13:20

## 2019-01-24 RX ADMIN — OXYCODONE HYDROCHLORIDE 10 MILLIGRAM(S): 5 TABLET ORAL at 09:30

## 2019-01-24 NOTE — PROGRESS NOTE ADULT - ATTENDING COMMENTS
Patient seen and examined.  Afebrile, vss.  Right knee bandage clean and dry.  OOB with PT, analgesics for pain control.  Discharge planning.
Patient seen and examined.  Afebrile, vss.  Right knee bandage clean and dry.  OOB with PT, analgesics for pain control.  Discharge planning.
Patient seen and examined.  Resting in bed.  Exam as noted above.  Agree with above plan.

## 2019-01-24 NOTE — PROGRESS NOTE ADULT - SUBJECTIVE AND OBJECTIVE BOX
Patient is seen and examined. Denies CP/SOB/DIzziness/N/V/D/HA. Pain is controlled.     Vital Signs Last 24 Hrs  Vital Signs Last 24 Hrs  T(C): 36.9 (24 Jan 2019 05:27), Max: 37.4 (23 Jan 2019 21:09)  T(F): 98.4 (24 Jan 2019 05:27), Max: 99.4 (23 Jan 2019 21:09)  HR: 84 (24 Jan 2019 05:27) (70 - 84)  BP: 103/51 (24 Jan 2019 05:27) (103/51 - 125/62)  BP(mean): --  RR: 16 (24 Jan 2019 05:27) (16 - 17)  SpO2: 98% (24 Jan 2019 05:27) (98% - 100%)    Gen: NAD  Resp:nonlabored  RLE: Dressing C/D/I. HV is in place.   Motor intact 5/5 RLE. SILT Sp/Dp/Melchor/Sap. Compartments are soft, extremities are warm. DP 1+ RLE.    Labs:                        10.5   11.07 )-----------( 280      ( 22 Jan 2019 06:51 )             32.4       01-22    137  |  104  |  10  ----------------------------<  178<H>  4.8   |  24  |  0.58    Ca    8.3<L>      22 Jan 2019 06:51        A/P: Patient is a 63y Female s/p R total knee arthroplasty, POD # 3    - Pain control / Analgesia  - Anticoagulation Eliquis  -PT/OT  -WBAT  -OOB  -Inc Spirometry  -Foot Pumps  -Rehab today

## 2019-01-24 NOTE — PROGRESS NOTE ADULT - SUBJECTIVE AND OBJECTIVE BOX
Patient is a 63y old  Female who presents with a chief complaint of right TKR (23 Jan 2019 14:27)      SUBJECTIVE / OVERNIGHT EVENTS:  Patient has no new complaints. Rash on face resolved. Denies cp, SOB, abdominal pain, N/V/D     MEDICATIONS  (STANDING):  acetaminophen   Tablet .. 975 milliGRAM(s) Oral every 8 hours  apixaban 2.5 milliGRAM(s) Oral every 12 hours  cholecalciferol 1000 Unit(s) Oral daily  docusate sodium 100 milliGRAM(s) Oral three times a day  lactated ringers. 1000 milliLiter(s) (75 mL/Hr) IV Continuous <Continuous>  multivitamin 1 Tablet(s) Oral daily  pantoprazole    Tablet 40 milliGRAM(s) Oral before breakfast  polyethylene glycol 3350 17 Gram(s) Oral daily  sodium chloride 0.9% Bolus 1000 milliLiter(s) IV Bolus once  sodium chloride 0.9% Bolus 1000 milliLiter(s) IV Bolus once  sodium chloride 0.9% lock flush 3 milliLiter(s) IV Push every 8 hours  traMADol 50 milliGRAM(s) Oral every 8 hours    MEDICATIONS  (PRN):  aluminum hydroxide/magnesium hydroxide/simethicone Suspension 30 milliLiter(s) Oral four times a day PRN Indigestion  bisacodyl Suppository 10 milliGRAM(s) Rectal daily PRN If no bowel movement by postoperative day #2  HYDROmorphone  Injectable 0.5 milliGRAM(s) IV Push every 4 hours PRN breakthrough pain  magnesium hydroxide Suspension 30 milliLiter(s) Oral daily PRN Constipation  ondansetron Injectable 4 milliGRAM(s) IV Push every 6 hours PRN Nausea and/or Vomiting  oxyCODONE    IR 5 milliGRAM(s) Oral every 3 hours PRN Moderate Pain (4 - 6)  oxyCODONE    IR 10 milliGRAM(s) Oral every 3 hours PRN Severe Pain (7 - 10)  senna 2 Tablet(s) Oral at bedtime PRN Constipation      Vital Signs Last 24 Hrs  T(C): 36.8 (24 Jan 2019 10:22), Max: 37.4 (23 Jan 2019 21:09)  T(F): 98.2 (24 Jan 2019 10:22), Max: 99.4 (23 Jan 2019 21:09)  HR: 80 (24 Jan 2019 10:22) (73 - 84)  BP: 127/73 (24 Jan 2019 10:22) (103/51 - 127/73)  BP(mean): --  RR: 16 (24 Jan 2019 10:22) (16 - 17)  SpO2: 99% (24 Jan 2019 10:22) (98% - 100%)  CAPILLARY BLOOD GLUCOSE        I&O's Summary    23 Jan 2019 07:01  -  24 Jan 2019 07:00  --------------------------------------------------------  IN: 0 mL / OUT: 1600 mL / NET: -1600 mL    24 Jan 2019 07:01  -  24 Jan 2019 12:29  --------------------------------------------------------  IN: 0 mL / OUT: 200 mL / NET: -200 mL        PHYSICAL EXAM:  GENERAL: NAD, well-developed  HEAD:  Atraumatic, Normocephalic  EYES: EOMI, PERRLA, conjunctiva and sclera clear  NECK: Supple, No JVD  CHEST/LUNG: Clear to auscultation bilaterally; No wheeze  HEART: Regular rate and rhythm; No murmurs, rubs, or gallops  ABDOMEN: Soft, Nontender, Nondistended; Bowel sounds present  EXTREMITIES:  2+ Peripheral Pulses, No clubbing, cyanosis, or edema  PSYCH: AAOx3  NEUROLOGY: non-focal  SKIN: No rashes or lesions    LABS:                        9.7    11.42 )-----------( 298      ( 24 Jan 2019 05:56 )             31.7                     RADIOLOGY & ADDITIONAL TESTS:    Imaging Personally Reviewed:    Consultant(s) Notes Reviewed:      Care Discussed with Consultants/Other Providers: ortho

## 2019-01-25 ENCOUNTER — EMERGENCY (EMERGENCY)
Facility: HOSPITAL | Age: 64
LOS: 1 days | Discharge: HOME | End: 2019-01-25
Attending: EMERGENCY MEDICINE | Admitting: EMERGENCY MEDICINE
Payer: COMMERCIAL

## 2019-01-25 VITALS
DIASTOLIC BLOOD PRESSURE: 59 MMHG | OXYGEN SATURATION: 100 % | HEART RATE: 86 BPM | RESPIRATION RATE: 16 BRPM | TEMPERATURE: 99 F | SYSTOLIC BLOOD PRESSURE: 128 MMHG

## 2019-01-25 VITALS
OXYGEN SATURATION: 97 % | DIASTOLIC BLOOD PRESSURE: 59 MMHG | RESPIRATION RATE: 16 BRPM | SYSTOLIC BLOOD PRESSURE: 117 MMHG | TEMPERATURE: 100 F | HEART RATE: 80 BPM

## 2019-01-25 DIAGNOSIS — Z90.49 ACQUIRED ABSENCE OF OTHER SPECIFIED PARTS OF DIGESTIVE TRACT: Chronic | ICD-10-CM

## 2019-01-25 DIAGNOSIS — Z98.891 HISTORY OF UTERINE SCAR FROM PREVIOUS SURGERY: Chronic | ICD-10-CM

## 2019-01-25 DIAGNOSIS — Z98.890 OTHER SPECIFIED POSTPROCEDURAL STATES: Chronic | ICD-10-CM

## 2019-01-25 LAB
ALBUMIN SERPL ELPH-MCNC: 3.8 G/DL — SIGNIFICANT CHANGE UP (ref 3.3–5)
ALP SERPL-CCNC: 89 U/L — SIGNIFICANT CHANGE UP (ref 40–120)
ALT FLD-CCNC: 125 U/L — HIGH (ref 4–33)
ANION GAP SERPL CALC-SCNC: 12 MMO/L — SIGNIFICANT CHANGE UP (ref 7–14)
APTT BLD: 39 SEC — HIGH (ref 27.5–36.3)
AST SERPL-CCNC: 75 U/L — HIGH (ref 4–32)
BASE EXCESS BLDV CALC-SCNC: 3.8 MMOL/L — SIGNIFICANT CHANGE UP
BASOPHILS # BLD AUTO: 0.04 K/UL — SIGNIFICANT CHANGE UP (ref 0–0.2)
BASOPHILS NFR BLD AUTO: 0.4 % — SIGNIFICANT CHANGE UP (ref 0–2)
BILIRUB SERPL-MCNC: 0.5 MG/DL — SIGNIFICANT CHANGE UP (ref 0.2–1.2)
BLD GP AB SCN SERPL QL: NEGATIVE — SIGNIFICANT CHANGE UP
BLOOD GAS VENOUS - CREATININE: 0.47 MG/DL — LOW (ref 0.5–1.3)
BUN SERPL-MCNC: 9 MG/DL — SIGNIFICANT CHANGE UP (ref 7–23)
CALCIUM SERPL-MCNC: 8.9 MG/DL — SIGNIFICANT CHANGE UP (ref 8.4–10.5)
CHLORIDE BLDV-SCNC: 105 MMOL/L — SIGNIFICANT CHANGE UP (ref 96–108)
CHLORIDE SERPL-SCNC: 100 MMOL/L — SIGNIFICANT CHANGE UP (ref 98–107)
CO2 SERPL-SCNC: 27 MMOL/L — SIGNIFICANT CHANGE UP (ref 22–31)
CREAT SERPL-MCNC: 0.64 MG/DL — SIGNIFICANT CHANGE UP (ref 0.5–1.3)
CRP SERPL-MCNC: 130.3 MG/L — HIGH
EOSINOPHIL # BLD AUTO: 0.15 K/UL — SIGNIFICANT CHANGE UP (ref 0–0.5)
EOSINOPHIL NFR BLD AUTO: 1.7 % — SIGNIFICANT CHANGE UP (ref 0–6)
ERYTHROCYTE [SEDIMENTATION RATE] IN BLOOD: 77 MM/HR — HIGH (ref 4–25)
GAS PNL BLDV: 135 MMOL/L — LOW (ref 136–146)
GLUCOSE BLDV-MCNC: 111 — HIGH (ref 70–99)
GLUCOSE SERPL-MCNC: 108 MG/DL — HIGH (ref 70–99)
HCO3 BLDV-SCNC: 27 MMOL/L — SIGNIFICANT CHANGE UP (ref 20–27)
HCT VFR BLD CALC: 32.1 % — LOW (ref 34.5–45)
HCT VFR BLDV CALC: 31.6 % — LOW (ref 34.5–45)
HGB BLD-MCNC: 10.1 G/DL — LOW (ref 11.5–15.5)
HGB BLDV-MCNC: 10.2 G/DL — LOW (ref 11.5–15.5)
IMM GRANULOCYTES NFR BLD AUTO: 0.7 % — SIGNIFICANT CHANGE UP (ref 0–1.5)
INR BLD: 1.2 — HIGH (ref 0.88–1.17)
LACTATE BLDV-MCNC: 1.2 MMOL/L — SIGNIFICANT CHANGE UP (ref 0.5–2)
LYMPHOCYTES # BLD AUTO: 3.13 K/UL — SIGNIFICANT CHANGE UP (ref 1–3.3)
LYMPHOCYTES # BLD AUTO: 34.5 % — SIGNIFICANT CHANGE UP (ref 13–44)
MCHC RBC-ENTMCNC: 29 PG — SIGNIFICANT CHANGE UP (ref 27–34)
MCHC RBC-ENTMCNC: 31.5 % — LOW (ref 32–36)
MCV RBC AUTO: 92.2 FL — SIGNIFICANT CHANGE UP (ref 80–100)
MONOCYTES # BLD AUTO: 0.72 K/UL — SIGNIFICANT CHANGE UP (ref 0–0.9)
MONOCYTES NFR BLD AUTO: 7.9 % — SIGNIFICANT CHANGE UP (ref 2–14)
NEUTROPHILS # BLD AUTO: 4.98 K/UL — SIGNIFICANT CHANGE UP (ref 1.8–7.4)
NEUTROPHILS NFR BLD AUTO: 54.8 % — SIGNIFICANT CHANGE UP (ref 43–77)
NRBC # FLD: 0 K/UL — LOW (ref 25–125)
PCO2 BLDV: 46 MMHG — SIGNIFICANT CHANGE UP (ref 41–51)
PH BLDV: 7.41 PH — SIGNIFICANT CHANGE UP (ref 7.32–7.43)
PLATELET # BLD AUTO: 338 K/UL — SIGNIFICANT CHANGE UP (ref 150–400)
PMV BLD: 9.5 FL — SIGNIFICANT CHANGE UP (ref 7–13)
PO2 BLDV: 34 MMHG — LOW (ref 35–40)
POTASSIUM BLDV-SCNC: 3.7 MMOL/L — SIGNIFICANT CHANGE UP (ref 3.4–4.5)
POTASSIUM SERPL-MCNC: 3.9 MMOL/L — SIGNIFICANT CHANGE UP (ref 3.5–5.3)
POTASSIUM SERPL-SCNC: 3.9 MMOL/L — SIGNIFICANT CHANGE UP (ref 3.5–5.3)
PROT SERPL-MCNC: 6.7 G/DL — SIGNIFICANT CHANGE UP (ref 6–8.3)
PROTHROM AB SERPL-ACNC: 13.4 SEC — HIGH (ref 9.8–13.1)
RBC # BLD: 3.48 M/UL — LOW (ref 3.8–5.2)
RBC # FLD: 13.3 % — SIGNIFICANT CHANGE UP (ref 10.3–14.5)
RH IG SCN BLD-IMP: POSITIVE — SIGNIFICANT CHANGE UP
SAO2 % BLDV: 62.2 % — SIGNIFICANT CHANGE UP (ref 60–85)
SODIUM SERPL-SCNC: 139 MMOL/L — SIGNIFICANT CHANGE UP (ref 135–145)
WBC # BLD: 9.08 K/UL — SIGNIFICANT CHANGE UP (ref 3.8–10.5)
WBC # FLD AUTO: 9.08 K/UL — SIGNIFICANT CHANGE UP (ref 3.8–10.5)

## 2019-01-25 PROCEDURE — 99284 EMERGENCY DEPT VISIT MOD MDM: CPT

## 2019-01-25 PROCEDURE — 93970 EXTREMITY STUDY: CPT | Mod: 26

## 2019-01-25 RX ORDER — OXYCODONE AND ACETAMINOPHEN 5; 325 MG/1; MG/1
1 TABLET ORAL ONCE
Qty: 0 | Refills: 0 | Status: DISCONTINUED | OUTPATIENT
Start: 2019-01-25 | End: 2019-01-25

## 2019-01-25 RX ADMIN — OXYCODONE AND ACETAMINOPHEN 1 TABLET(S): 5; 325 TABLET ORAL at 13:09

## 2019-01-25 NOTE — ED PROVIDER NOTE - OBJECTIVE STATEMENT
10:09 Stacia att: 63F POD#5 rt knee replacement p/w rle swelling and pain x 2 day. Discharged yesterday to rehab. Since yesterday patient notes rle swollen and tender with fever tmax 100.2F no abx. Denies cp, sob, or inability to flex joint. Spoke with operating ortho surgeon Dr. Hernandez and referred to ER r/o dvt.

## 2019-01-25 NOTE — CONSULT NOTE ADULT - SUBJECTIVE AND OBJECTIVE BOX
63F who underwent R TKA with Dr. Hernandez on , discharged to rehab yesterday, who was sent in to the ER to r/o R knee infection. Patient cites subjective fevers and some malaise, but otherwise had been doing well and ambulating in rehab with a walker. Reportedly several low grade temps to the low 100s in rehab. No chills or night sweats, no urinary or respiratory symptoms. No shortness of breath or chest pain/chest pressure. No falls or trauma, no pain with weightbearing.     Review of systems: Denies fever, chills, nausea, vomiting, recent infection, previous fractures.    PAST MEDICAL & SURGICAL HISTORY:  Unilateral primary osteoarthritis, right knee  Carpal tunnel syndrome  Cholecystitis  Meniscal injury: right knee  Shingles  GERD (gastroesophageal reflux disease)  History of arthroscopy of left shoulder: 2018  S/P right knee arthroscopy: x2  S/P : ,   S/P carpal tunnel release: B/L  S/P cholecystectomy    FAMILY HISTORY:  Family history of GERD (Mother)  Family history of other heart disease (Father): ppm  Family history of diabetes mellitus (DM) (Father)    MEDICATIONS  (STANDING):    MEDICATIONS  (PRN):                            9.7    11.42 )-----------( 298      ( 2019 05:56 )             31.7       T(C): 37.6 (19 @ 10:22), Max: 37.6 (19 @ 10:22)  HR: 80 (19 @ 10:22) (80 - 86)  BP: 117/59 (19 @ 10:22) (117/59 - 128/59)  RR: 16 (19 @ 10:22) (16 - 16)  SpO2: 97% (19 @ 10:22) (97% - 100%)  Wt(kg): --    EXAM:  NAD  RLE diffuse edema with dependent erythema posterior thigh and calf, incision line clean with small spot of blood, staples intact, no erythema or purulent drainage, slight increased warmth to the touch, able to SLR, ROM 0-45 deg limited by fullness and pain anteriorly, no posterior knee TTP, or TTP about the medial or lateral tibial plateau  Aquacell dressing small spot of dried blood, otherwise C/D/I  EHL/FHL/TA/GS intact  SILT SP/DP/T/S/S  Compartments soft and compressible  Mild TTP of the midcalf, mildly positive Wilfrido's sign  WWP brisk cap refill  LLE exam NVI, no calf TTP, neg Wilfrido's    Imaging:   pending    63y Female r/o R TKA post-op infection  - Low clinical concern for infection, more likely dependent edema and hemarthrosis/hematoma, however would rule out DVT  - Xray R knee   - Duplex bilateral lower extremities  - F/u CBC and ESR/CRP  - Mepilex dressing and compressive ACE applied  - Elevate  - Cont DVT ppx  - No acute ortho surgical intervention  - Discussed with Dr. Hernandez 63F who underwent R TKA with Dr. Hernandez on , discharged to rehab yesterday, who was sent in to the ER to r/o R knee infection. Patient cites subjective fevers and some malaise, but otherwise had been doing well and ambulating in rehab with a walker. Reportedly several low grade temps to the low 100s in rehab. No chills or night sweats, no urinary or respiratory symptoms. No shortness of breath or chest pain/chest pressure. No falls or trauma, no pain with weightbearing.     Review of systems: Denies fever, chills, nausea, vomiting, recent infection, previous fractures.    PAST MEDICAL & SURGICAL HISTORY:  Unilateral primary osteoarthritis, right knee  Carpal tunnel syndrome  Cholecystitis  Meniscal injury: right knee  Shingles  GERD (gastroesophageal reflux disease)  History of arthroscopy of left shoulder: 2018  S/P right knee arthroscopy: x2  S/P : ,   S/P carpal tunnel release: B/L  S/P cholecystectomy    FAMILY HISTORY:  Family history of GERD (Mother)  Family history of other heart disease (Father): ppm  Family history of diabetes mellitus (DM) (Father)    MEDICATIONS  (STANDING):    MEDICATIONS  (PRN):                            9.7    11.42 )-----------( 298      ( 2019 05:56 )             31.7       T(C): 37.6 (19 @ 10:22), Max: 37.6 (19 @ 10:22)  HR: 80 (19 @ 10:22) (80 - 86)  BP: 117/59 (19 @ 10:22) (117/59 - 128/59)  RR: 16 (19 @ 10:22) (16 - 16)  SpO2: 97% (19 @ 10:22) (97% - 100%)  Wt(kg): --    EXAM:  NAD  RLE diffuse edema with dependent erythema posterior thigh and calf, incision line clean with small spot of blood, staples intact, no erythema or purulent drainage, slight increased warmth to the touch, able to SLR, ROM 0-45 deg limited by fullness and pain anteriorly, no posterior knee TTP, or TTP about the medial or lateral tibial plateau  Aquacell dressing small spot of dried blood, otherwise C/D/I  EHL/FHL/TA/GS intact  SILT SP/DP/T/S/S  Compartments soft and compressible  Mild TTP of the midcalf, mildly positive Wilfrido's sign  WWP brisk cap refill  LLE exam NVI, no calf TTP, neg Wilfrido's    Imaging:   pending

## 2019-01-25 NOTE — ED PROVIDER NOTE - PROGRESS NOTE DETAILS
Seen by ortho resident, agreed r/o dvt, request add esr and crp Stacia att: Duplex neg, esr cpr neg, mild wbc, afebrile in ED. Patient seen at bedside by Dr. Hernandez and his resident. Dx post op edema, neg dvt. Dr. Hernandez spoke with Rehab Dr. Hernandez and relayed same info. Ok for dc home. SW to arrange transport.

## 2019-01-25 NOTE — PROVIDER CONTACT NOTE (OTHER) - ASSESSMENT
patient is cleared to come back.  [facility] to transport at approximately 2:30 p.m.  This is a private pay.  Patient is in agreement with discharge plan.

## 2019-01-25 NOTE — CONSULT NOTE ADULT - ASSESSMENT
63y Female r/o R TKA post-op infection  - Low clinical concern for infection, more likely dependent edema and hemarthrosis/hematoma, however would rule out DVT  - Xray R knee   - Duplex bilateral lower extremities  - F/u CBC and ESR/CRP  - Mepilex dressing and compressive ACE applied  - Elevate  - Cont DVT ppx  - No acute ortho surgical intervention  - Discussed with Dr. Hernandez 63y Female r/o R TKA post-op infection  - Low clinical concern for infection, more likely dependent edema and hemarthrosis/hematoma, however would rule out DVT  - Duplex bilateral lower extremities  - F/u CBC and ESR/CRP  - Mepilex dressing and compressive ACE applied  - Elevate  - Cont DVT ppx  - No acute ortho surgical intervention  - Discussed with Dr. Hernandez

## 2019-01-25 NOTE — ED PROVIDER NOTE - CALF TENDERNESS
none Detail Level: Simple Duration Of Freeze Thaw-Cycle (Seconds): 0 Post-Care Instructions: I reviewed with the patient in detail post-care instructions. Patient is to wear sunprotection, and avoid picking at any of the treated lesions. Pt may apply Vaseline to crusted or scabbing areas. Total Number Of Aks Treated: 3 Render Post-Care Instructions In Note?: no Consent: The patient's consent was obtained including but not limited to risks of crusting, scabbing, blistering, scarring, darker or lighter pigmentary change, recurrence, incomplete removal and infection.

## 2019-01-25 NOTE — ED ADULT NURSE NOTE - NSIMPLEMENTINTERV_GEN_ALL_ED
Implemented All Fall with Harm Risk Interventions:  [place] to call system. Call bell, personal items and telephone within reach. Instruct patient to call for assistance. Room bathroom lighting operational. Non-slip footwear when patient is off stretcher. Physically safe environment: no spills, clutter or unnecessary equipment. Stretcher in lowest position, wheels locked, appropriate side rails in place. Provide visual cue, wrist band, yellow gown, etc. Monitor gait and stability. Monitor for mental status changes and reorient to person, place, and time. Review medications for side effects contributing to fall risk. Reinforce activity limits and safety measures with patient and family. Provide visual clues: red socks.

## 2019-01-25 NOTE — ED PROVIDER NOTE - NSFOLLOWUPINSTRUCTIONS_ED_ALL_ED_FT
Seen in ER for right lower extremity swelling 5 days after surgery. Ultrasound shows no blood clot. Blood work shows no infection. You were seen by Dr. Hernandez per whom the diagnosis is post operative swelling. The Ortho team wrapped your leg to reduce swelling. Please continue your medications and keep your follow up appointments as you have been. Return to ER for new or worsening symptoms.

## 2019-01-25 NOTE — ED ADULT NURSE NOTE - OBJECTIVE STATEMENT
pt alert,oriented x3. skin warm,dry.redness,swelling r knee noted. ed md to jennifer,ortho resident  to eval. iv access,labs sent. pt to [facility]

## 2019-01-26 LAB
SPECIMEN SOURCE: SIGNIFICANT CHANGE UP
SPECIMEN SOURCE: SIGNIFICANT CHANGE UP

## 2019-01-27 LAB
METHOD TYPE: SIGNIFICANT CHANGE UP
ORGANISM # SPEC MICROSCOPIC CNT: SIGNIFICANT CHANGE UP
ORGANISM # SPEC MICROSCOPIC CNT: SIGNIFICANT CHANGE UP

## 2019-01-27 NOTE — ED POST DISCHARGE NOTE - DETAILS
spoke with  and informed him of results. Pt at [facility], receiving rehab services.  will call them tmrw. Also called [phone] line and left VM requesting callback to pt. [facility] facility contacted at [phone] and floor RN informed of results. Ulysses Espinosa informed of results. He states pt is afebrile. He will repeat blood cultures and transfer back to [facility] if necessary.

## 2019-01-28 LAB
-  CANDIDA ALBICANS: SIGNIFICANT CHANGE UP
-  CANDIDA GLABRATA: SIGNIFICANT CHANGE UP
-  CANDIDA KRUSEI: SIGNIFICANT CHANGE UP
-  CANDIDA PARAPSILOSIS: SIGNIFICANT CHANGE UP
-  CANDIDA TROPICALIS: SIGNIFICANT CHANGE UP
-  COAGULASE NEGATIVE STAPHYLOCOCCUS: SIGNIFICANT CHANGE UP
-  K. PNEUMONIAE GROUP: SIGNIFICANT CHANGE UP
-  KPC RESISTANCE GENE: SIGNIFICANT CHANGE UP
-  STREPTOCOCCUS SP. (NOT GRP A, B OR S PNEUMONIAE): SIGNIFICANT CHANGE UP
A BAUMANNII DNA SPEC QL NAA+PROBE: SIGNIFICANT CHANGE UP
BACTERIA BLD CULT: SIGNIFICANT CHANGE UP
E CLOAC COMP DNA BLD POS QL NAA+PROBE: SIGNIFICANT CHANGE UP
E COLI DNA BLD POS QL NAA+NON-PROBE: SIGNIFICANT CHANGE UP
ENTEROCOC DNA BLD POS QL NAA+NON-PROBE: SIGNIFICANT CHANGE UP
ENTEROCOC DNA BLD POS QL NAA+NON-PROBE: SIGNIFICANT CHANGE UP
GP B STREP DNA BLD POS QL NAA+NON-PROBE: SIGNIFICANT CHANGE UP
HAEM INFLU DNA BLD POS QL NAA+NON-PROBE: SIGNIFICANT CHANGE UP
K OXYTOCA DNA BLD POS QL NAA+NON-PROBE: SIGNIFICANT CHANGE UP
L MONOCYTOG DNA BLD POS QL NAA+NON-PROBE: SIGNIFICANT CHANGE UP
MRSA SPEC QL CULT: SIGNIFICANT CHANGE UP
MSSA DNA SPEC QL NAA+PROBE: SIGNIFICANT CHANGE UP
N MEN ISLT CULT: SIGNIFICANT CHANGE UP
ORGANISM # SPEC MICROSCOPIC CNT: SIGNIFICANT CHANGE UP
P AERUGINOSA DNA BLD POS NAA+NON-PROBE: SIGNIFICANT CHANGE UP
S MARCESCENS DNA BLD POS NAA+NON-PROBE: SIGNIFICANT CHANGE UP
S PNEUM DNA BLD POS QL NAA+NON-PROBE: SIGNIFICANT CHANGE UP
S PYO DNA BLD POS QL NAA+NON-PROBE: SIGNIFICANT CHANGE UP

## 2019-01-30 LAB — BACTERIA BLD CULT: SIGNIFICANT CHANGE UP

## 2019-02-06 ENCOUNTER — APPOINTMENT (OUTPATIENT)
Dept: ORTHOPEDIC SURGERY | Facility: CLINIC | Age: 64
End: 2019-02-06
Payer: COMMERCIAL

## 2019-02-06 PROCEDURE — 99024 POSTOP FOLLOW-UP VISIT: CPT

## 2019-02-28 ENCOUNTER — APPOINTMENT (OUTPATIENT)
Dept: ORTHOPEDIC SURGERY | Facility: CLINIC | Age: 64
End: 2019-02-28
Payer: COMMERCIAL

## 2019-03-12 ENCOUNTER — RX RENEWAL (OUTPATIENT)
Age: 64
End: 2019-03-12

## 2019-03-13 ENCOUNTER — APPOINTMENT (OUTPATIENT)
Dept: ORTHOPEDIC SURGERY | Facility: CLINIC | Age: 64
End: 2019-03-13
Payer: COMMERCIAL

## 2019-03-13 DIAGNOSIS — Z96.651 PRESENCE OF RIGHT ARTIFICIAL KNEE JOINT: ICD-10-CM

## 2019-03-13 PROCEDURE — 99024 POSTOP FOLLOW-UP VISIT: CPT

## 2019-03-13 RX ORDER — APIXABAN 2.5 MG/1
2.5 TABLET, FILM COATED ORAL
Qty: 28 | Refills: 0 | Status: COMPLETED | COMMUNITY
Start: 2019-01-24

## 2019-04-05 ENCOUNTER — RX RENEWAL (OUTPATIENT)
Age: 64
End: 2019-04-05

## 2019-04-08 ENCOUNTER — RX RENEWAL (OUTPATIENT)
Age: 64
End: 2019-04-08

## 2019-04-18 ENCOUNTER — APPOINTMENT (OUTPATIENT)
Dept: ORTHOPEDIC SURGERY | Facility: CLINIC | Age: 64
End: 2019-04-18
Payer: COMMERCIAL

## 2019-04-18 VITALS
BODY MASS INDEX: 26.93 KG/M2 | HEART RATE: 66 BPM | SYSTOLIC BLOOD PRESSURE: 112 MMHG | DIASTOLIC BLOOD PRESSURE: 72 MMHG | WEIGHT: 152 LBS | HEIGHT: 63 IN

## 2019-04-18 PROCEDURE — 99024 POSTOP FOLLOW-UP VISIT: CPT

## 2019-05-23 ENCOUNTER — RX RENEWAL (OUTPATIENT)
Age: 64
End: 2019-05-23

## 2019-05-30 ENCOUNTER — APPOINTMENT (OUTPATIENT)
Dept: ORTHOPEDIC SURGERY | Facility: CLINIC | Age: 64
End: 2019-05-30
Payer: COMMERCIAL

## 2019-05-30 VITALS
HEIGHT: 63 IN | BODY MASS INDEX: 26.93 KG/M2 | WEIGHT: 152 LBS | DIASTOLIC BLOOD PRESSURE: 66 MMHG | SYSTOLIC BLOOD PRESSURE: 103 MMHG | HEART RATE: 91 BPM

## 2019-05-30 DIAGNOSIS — Z96.651 PRESENCE OF RIGHT ARTIFICIAL KNEE JOINT: ICD-10-CM

## 2019-05-30 PROCEDURE — 99213 OFFICE O/P EST LOW 20 MIN: CPT

## 2019-05-30 PROCEDURE — 73562 X-RAY EXAM OF KNEE 3: CPT

## 2019-06-14 ENCOUNTER — RX RENEWAL (OUTPATIENT)
Age: 64
End: 2019-06-14

## 2019-07-11 ENCOUNTER — APPOINTMENT (OUTPATIENT)
Dept: ORTHOPEDIC SURGERY | Facility: CLINIC | Age: 64
End: 2019-07-11
Payer: COMMERCIAL

## 2019-07-11 VITALS
WEIGHT: 152 LBS | HEART RATE: 69 BPM | BODY MASS INDEX: 26.93 KG/M2 | SYSTOLIC BLOOD PRESSURE: 110 MMHG | DIASTOLIC BLOOD PRESSURE: 70 MMHG | HEIGHT: 63 IN

## 2019-07-11 DIAGNOSIS — M17.11 UNILATERAL PRIMARY OSTEOARTHRITIS, RIGHT KNEE: ICD-10-CM

## 2019-07-11 DIAGNOSIS — M25.661 STIFFNESS OF RIGHT KNEE, NOT ELSEWHERE CLASSIFIED: ICD-10-CM

## 2019-07-11 PROCEDURE — 99213 OFFICE O/P EST LOW 20 MIN: CPT

## 2019-09-10 ENCOUNTER — APPOINTMENT (OUTPATIENT)
Dept: GASTROENTEROLOGY | Facility: CLINIC | Age: 64
End: 2019-09-10

## 2019-09-12 ENCOUNTER — APPOINTMENT (OUTPATIENT)
Dept: ORTHOPEDIC SURGERY | Facility: CLINIC | Age: 64
End: 2019-09-12

## 2019-10-29 NOTE — ASU PREOPERATIVE ASSESSMENT, ADULT (IPARK ONLY) - PERSONAL BELONGINGS
Received fax from [employer] ph# [phone] needing orders signed for Anson Reddy Basic 20-30 thigh IISH Beige  Qty #6   Dx:  Edema, unspecified  R60.9    Script/orders reviewed and signed by Dr. Morocho and faxed back to [facility].   lock

## 2020-02-04 NOTE — OCCUPATIONAL THERAPY INITIAL EVALUATION ADULT - LEVEL OF INDEPENDENCE: DRESS UPPER BODY, OT EVAL
Called patient who was notified per Dr. De Dios, she can try taking either a half of a tablet or a quarter of a tablet of the Hydroxyzine if she would like. Patient verbalized understanding and has no further questions at this time.  
Lab stated the wrong label was put on the specimen and therefore they could not use the specimen that was brought into office. Writer called patient who was notified and stated she will come into the office today to get a new kit.       Patient also stated she wants Dr. De Dios to know she did try taking the Hydroxyzine but it made her feel ill/out of it and she stated she does not want any other new medications prescribed until she sees Dr. De Dios in March to discuss this with him. Writer will notify Dr. De Dios.  
Patient's  called checking on an update on the occult blood test that was dropped off 1/27. Please call them back at home.    
Writer spoke to lab who stated they will be checking on the status of the specimen/see if it was dropped of, writer will then call patient back.   
independent

## 2020-02-13 NOTE — H&P PST ADULT - TEMPERATURE IN FAHRENHEIT (DEGREES F)
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 Pylori, EGD Biopsy.  The patient will undergo definitive external beam RT to stomach using 3-D conformal planning with curative intent. The table below states the radiation prescription, fractionation scheme and technique that will be use.   Site Technique Modality Total Dose # Fractions Fraction Dose   Gastric region CONFORMAL 20X 3,060 cGy 17 180 cGy     Because of the following reasons, 3-D simulation and the stated treatment planning was done and this was medically necessary.  1. Close proximity of the normal structures shown below to the target volumes   2. Irregular shape of all of the normal structures and the target volumes  3. Need for DVH to review the plan and spare the normal tissue    CT simulation was done. CT images obtained and other appropriate imaging studies were brought into the treatment planning system. The targets were carefully contoured using all the available images as well as critical normal organs in the area of the treatment.    The following structures are outlined and the following dose constraints are used for treatment planning review and DVH. (NCCN)    Liver 60% liver <30Gy, Mean dose <25Gy  Kidneys 2/3 of one kidney <20Gy  Spinal cord <45Gy  Heart 1/3 volume < 40Gy    In addition, Institutional Guidelines were used to ensure adequate coverage of the target volume and adequate avoidance of critical structures.  The DVH and isodose curves  were carefully reviewed within the treatment planning system. After making sure of adequate coverage of the target volumes and dose constraints of the critical normal organs, the plan was approved.     For details of the plan, please refer to the scanned in pdf in [employer] EMR that captures the relevant details of the patient’s plan.       Authenticated by Barron Castillo M.D. on 6/19/2017 at 8:41 AM  BARRON CASTILLO M.D.     98.1

## 2020-10-08 ENCOUNTER — APPOINTMENT (OUTPATIENT)
Dept: GASTROENTEROLOGY | Facility: CLINIC | Age: 65
End: 2020-10-08
Payer: MEDICARE

## 2020-10-08 VITALS
OXYGEN SATURATION: 98 % | BODY MASS INDEX: 27.82 KG/M2 | DIASTOLIC BLOOD PRESSURE: 81 MMHG | HEART RATE: 67 BPM | HEIGHT: 63 IN | WEIGHT: 157 LBS | SYSTOLIC BLOOD PRESSURE: 128 MMHG

## 2020-10-08 DIAGNOSIS — K59.09 OTHER CONSTIPATION: ICD-10-CM

## 2020-10-08 DIAGNOSIS — K21.9 GASTRO-ESOPHAGEAL REFLUX DISEASE W/OUT ESOPHAGITIS: ICD-10-CM

## 2020-10-08 PROCEDURE — 99214 OFFICE O/P EST MOD 30 MIN: CPT

## 2020-10-08 RX ORDER — TRAMADOL HYDROCHLORIDE 50 MG/1
50 TABLET, COATED ORAL
Qty: 21 | Refills: 0 | Status: DISCONTINUED | COMMUNITY
Start: 2018-11-20 | End: 2020-10-08

## 2020-10-08 RX ORDER — CELECOXIB 200 MG/1
200 CAPSULE ORAL
Qty: 14 | Refills: 0 | Status: DISCONTINUED | COMMUNITY
Start: 2018-11-02 | End: 2020-10-08

## 2020-10-08 RX ORDER — TRAMADOL HYDROCHLORIDE 50 MG/1
50 TABLET, COATED ORAL
Qty: 30 | Refills: 0 | Status: DISCONTINUED | COMMUNITY
Start: 2019-03-13 | End: 2020-10-08

## 2020-10-08 RX ORDER — MUPIROCIN 20 MG/G
2 OINTMENT TOPICAL
Qty: 1 | Refills: 0 | Status: DISCONTINUED | COMMUNITY
Start: 2019-01-15 | End: 2020-10-08

## 2020-10-08 RX ORDER — OXYCODONE 5 MG/1
5 TABLET ORAL
Qty: 42 | Refills: 0 | Status: DISCONTINUED | COMMUNITY
Start: 2019-02-17 | End: 2020-10-08

## 2020-10-08 RX ORDER — DICLOFENAC SODIUM 10 MG/G
1 GEL TOPICAL DAILY
Qty: 1 | Refills: 2 | Status: DISCONTINUED | COMMUNITY
Start: 2018-11-14 | End: 2020-10-08

## 2020-10-08 RX ORDER — GABAPENTIN 100 MG/1
100 CAPSULE ORAL
Qty: 90 | Refills: 0 | Status: DISCONTINUED | COMMUNITY
Start: 2019-04-18 | End: 2020-10-08

## 2020-10-08 RX ORDER — GABAPENTIN 100 MG/1
100 CAPSULE ORAL
Qty: 42 | Refills: 3 | Status: DISCONTINUED | COMMUNITY
Start: 2019-04-08 | End: 2020-10-08

## 2020-10-08 RX ORDER — GABAPENTIN 100 MG/1
100 CAPSULE ORAL
Qty: 90 | Refills: 0 | Status: DISCONTINUED | COMMUNITY
Start: 2019-02-17 | End: 2020-10-08

## 2020-10-08 RX ORDER — OXYCODONE 10 MG/1
10 TABLET ORAL
Qty: 45 | Refills: 0 | Status: DISCONTINUED | COMMUNITY
Start: 2019-02-05 | End: 2020-10-08

## 2020-10-08 RX ORDER — TRAMADOL HYDROCHLORIDE 50 MG/1
50 TABLET, COATED ORAL
Qty: 30 | Refills: 0 | Status: DISCONTINUED | COMMUNITY
Start: 2019-04-18 | End: 2020-10-08

## 2020-10-08 RX ORDER — SULFAMETHOXAZOLE AND TRIMETHOPRIM 800; 160 MG/1; MG/1
800-160 TABLET ORAL TWICE DAILY
Qty: 6 | Refills: 0 | Status: DISCONTINUED | COMMUNITY
Start: 2019-01-15 | End: 2020-10-08

## 2020-10-08 NOTE — ASSESSMENT
[FreeTextEntry1] : Imp:\par worsening GERD in setting of stress, surgery for knee, post abx before dental work, frustrrating rehab\par Constipation\par \par Plan: \par \par MOM or miralax, prn\par If not better Pepcid BID\par Colonscopy in 2023

## 2020-10-08 NOTE — HISTORY OF PRESENT ILLNESS
[de-identified] : The patient moves bowels daily. Denies any abdominal pain, constipation, diarrhea, bright red blood per rectum. Weight is stable.\par \par No more heartburn and no odynophagia, dysphagia or early satiety.\par \par No history of anemia or abnormal liver enzymes.\par \par Due for screening colonoscopy.

## 2021-11-16 ENCOUNTER — APPOINTMENT (OUTPATIENT)
Dept: GASTROENTEROLOGY | Facility: CLINIC | Age: 66
End: 2021-11-16
Payer: MEDICARE

## 2021-11-16 VITALS
DIASTOLIC BLOOD PRESSURE: 72 MMHG | BODY MASS INDEX: 27.46 KG/M2 | HEART RATE: 67 BPM | WEIGHT: 155 LBS | OXYGEN SATURATION: 98 % | HEIGHT: 63 IN | SYSTOLIC BLOOD PRESSURE: 116 MMHG

## 2021-11-16 PROCEDURE — 99214 OFFICE O/P EST MOD 30 MIN: CPT

## 2021-11-16 NOTE — ASSESSMENT
[FreeTextEntry1] : Imp:\par Stable GERD in setting of stress.\par Now s/p 2 weeks diarrhea tx'd with emycin.\par \par Plan: \par \par Contin MOM or miralax, prn, Pepto BID\par Resume  Pepcid BID\par Colonscopy in 2023

## 2021-11-16 NOTE — HISTORY OF PRESENT ILLNESS
[de-identified] : Pt is s/p 2 weeks diarrhea that stopped with 2 doses of emycin. \par \par The patient now moves bowels daily. Denies any abdominal pain, constipation, diarrhea, bright red blood per rectum. Weight is stable.\par \par No more heartburn and no odynophagia, dysphagia or early satiety.\par \par No history of anemia or abnormal liver enzymes.

## 2021-11-18 DIAGNOSIS — R19.7 DIARRHEA, UNSPECIFIED: ICD-10-CM

## 2021-12-18 ENCOUNTER — TRANSCRIPTION ENCOUNTER (OUTPATIENT)
Age: 66
End: 2021-12-18

## 2022-10-03 ENCOUNTER — EMERGENCY (EMERGENCY)
Facility: HOSPITAL | Age: 67
LOS: 1 days | Discharge: HOME | End: 2022-10-03
Attending: EMERGENCY MEDICINE
Payer: MEDICARE

## 2022-10-03 VITALS
OXYGEN SATURATION: 99 % | WEIGHT: 158.95 LBS | TEMPERATURE: 98 F | DIASTOLIC BLOOD PRESSURE: 74 MMHG | HEART RATE: 77 BPM | SYSTOLIC BLOOD PRESSURE: 150 MMHG | RESPIRATION RATE: 18 BRPM | HEIGHT: 63 IN

## 2022-10-03 VITALS
DIASTOLIC BLOOD PRESSURE: 79 MMHG | SYSTOLIC BLOOD PRESSURE: 122 MMHG | OXYGEN SATURATION: 99 % | HEART RATE: 63 BPM | RESPIRATION RATE: 16 BRPM | TEMPERATURE: 98 F

## 2022-10-03 DIAGNOSIS — Z98.890 OTHER SPECIFIED POSTPROCEDURAL STATES: Chronic | ICD-10-CM

## 2022-10-03 DIAGNOSIS — Z98.891 HISTORY OF UTERINE SCAR FROM PREVIOUS SURGERY: Chronic | ICD-10-CM

## 2022-10-03 DIAGNOSIS — Z90.49 ACQUIRED ABSENCE OF OTHER SPECIFIED PARTS OF DIGESTIVE TRACT: Chronic | ICD-10-CM

## 2022-10-03 LAB
ALBUMIN SERPL ELPH-MCNC: 4.8 G/DL — SIGNIFICANT CHANGE UP (ref 3.3–5)
ALP SERPL-CCNC: 75 U/L — SIGNIFICANT CHANGE UP (ref 40–120)
ALT FLD-CCNC: 32 U/L — SIGNIFICANT CHANGE UP (ref 10–45)
ANION GAP SERPL CALC-SCNC: 12 MMOL/L — SIGNIFICANT CHANGE UP (ref 5–17)
APPEARANCE UR: CLEAR — SIGNIFICANT CHANGE UP
AST SERPL-CCNC: 29 U/L — SIGNIFICANT CHANGE UP (ref 10–40)
BACTERIA # UR AUTO: NEGATIVE — SIGNIFICANT CHANGE UP
BASOPHILS # BLD AUTO: 0.05 K/UL — SIGNIFICANT CHANGE UP (ref 0–0.2)
BASOPHILS NFR BLD AUTO: 0.6 % — SIGNIFICANT CHANGE UP (ref 0–2)
BILIRUB SERPL-MCNC: 0.3 MG/DL — SIGNIFICANT CHANGE UP (ref 0.2–1.2)
BILIRUB UR-MCNC: NEGATIVE — SIGNIFICANT CHANGE UP
BUN SERPL-MCNC: 15 MG/DL — SIGNIFICANT CHANGE UP (ref 7–23)
CALCIUM SERPL-MCNC: 9.3 MG/DL — SIGNIFICANT CHANGE UP (ref 8.4–10.5)
CHLORIDE SERPL-SCNC: 103 MMOL/L — SIGNIFICANT CHANGE UP (ref 96–108)
CO2 SERPL-SCNC: 23 MMOL/L — SIGNIFICANT CHANGE UP (ref 22–31)
COLOR SPEC: SIGNIFICANT CHANGE UP
CREAT SERPL-MCNC: 0.82 MG/DL — SIGNIFICANT CHANGE UP (ref 0.5–1.3)
DIFF PNL FLD: NEGATIVE — SIGNIFICANT CHANGE UP
EGFR: 78 ML/MIN/1.73M2 — SIGNIFICANT CHANGE UP
EOSINOPHIL # BLD AUTO: 0.13 K/UL — SIGNIFICANT CHANGE UP (ref 0–0.5)
EOSINOPHIL NFR BLD AUTO: 1.7 % — SIGNIFICANT CHANGE UP (ref 0–6)
EPI CELLS # UR: 1 /HPF — SIGNIFICANT CHANGE UP
GLUCOSE SERPL-MCNC: 98 MG/DL — SIGNIFICANT CHANGE UP (ref 70–99)
GLUCOSE UR QL: NEGATIVE — SIGNIFICANT CHANGE UP
HCT VFR BLD CALC: 44.6 % — SIGNIFICANT CHANGE UP (ref 34.5–45)
HGB BLD-MCNC: 14.4 G/DL — SIGNIFICANT CHANGE UP (ref 11.5–15.5)
HYALINE CASTS # UR AUTO: 2 /LPF — SIGNIFICANT CHANGE UP (ref 0–2)
IMM GRANULOCYTES NFR BLD AUTO: 0.3 % — SIGNIFICANT CHANGE UP (ref 0–0.9)
KETONES UR-MCNC: ABNORMAL
LEUKOCYTE ESTERASE UR-ACNC: NEGATIVE — SIGNIFICANT CHANGE UP
LIDOCAIN IGE QN: 24 U/L — SIGNIFICANT CHANGE UP (ref 7–60)
LYMPHOCYTES # BLD AUTO: 2.78 K/UL — SIGNIFICANT CHANGE UP (ref 1–3.3)
LYMPHOCYTES # BLD AUTO: 35.4 % — SIGNIFICANT CHANGE UP (ref 13–44)
MCHC RBC-ENTMCNC: 29.4 PG — SIGNIFICANT CHANGE UP (ref 27–34)
MCHC RBC-ENTMCNC: 32.3 GM/DL — SIGNIFICANT CHANGE UP (ref 32–36)
MCV RBC AUTO: 91 FL — SIGNIFICANT CHANGE UP (ref 80–100)
MONOCYTES # BLD AUTO: 0.71 K/UL — SIGNIFICANT CHANGE UP (ref 0–0.9)
MONOCYTES NFR BLD AUTO: 9 % — SIGNIFICANT CHANGE UP (ref 2–14)
NEUTROPHILS # BLD AUTO: 4.17 K/UL — SIGNIFICANT CHANGE UP (ref 1.8–7.4)
NEUTROPHILS NFR BLD AUTO: 53 % — SIGNIFICANT CHANGE UP (ref 43–77)
NITRITE UR-MCNC: NEGATIVE — SIGNIFICANT CHANGE UP
NRBC # BLD: 0 /100 WBCS — SIGNIFICANT CHANGE UP (ref 0–0)
PH UR: 5.5 — SIGNIFICANT CHANGE UP (ref 5–8)
PLATELET # BLD AUTO: 344 K/UL — SIGNIFICANT CHANGE UP (ref 150–400)
POTASSIUM SERPL-MCNC: 4.6 MMOL/L — SIGNIFICANT CHANGE UP (ref 3.5–5.3)
POTASSIUM SERPL-SCNC: 4.6 MMOL/L — SIGNIFICANT CHANGE UP (ref 3.5–5.3)
PROT SERPL-MCNC: 8.2 G/DL — SIGNIFICANT CHANGE UP (ref 6–8.3)
PROT UR-MCNC: NEGATIVE — SIGNIFICANT CHANGE UP
RBC # BLD: 4.9 M/UL — SIGNIFICANT CHANGE UP (ref 3.8–5.2)
RBC # FLD: 13.2 % — SIGNIFICANT CHANGE UP (ref 10.3–14.5)
RBC CASTS # UR COMP ASSIST: 0 /HPF — SIGNIFICANT CHANGE UP (ref 0–4)
SODIUM SERPL-SCNC: 138 MMOL/L — SIGNIFICANT CHANGE UP (ref 135–145)
SP GR SPEC: 1.02 — SIGNIFICANT CHANGE UP (ref 1.01–1.02)
UROBILINOGEN FLD QL: NEGATIVE — SIGNIFICANT CHANGE UP
WBC # BLD: 7.86 K/UL — SIGNIFICANT CHANGE UP (ref 3.8–10.5)
WBC # FLD AUTO: 7.86 K/UL — SIGNIFICANT CHANGE UP (ref 3.8–10.5)
WBC UR QL: 0 /HPF — SIGNIFICANT CHANGE UP (ref 0–5)

## 2022-10-03 PROCEDURE — 99284 EMERGENCY DEPT VISIT MOD MDM: CPT

## 2022-10-03 PROCEDURE — 87086 URINE CULTURE/COLONY COUNT: CPT

## 2022-10-03 PROCEDURE — 36415 COLL VENOUS BLD VENIPUNCTURE: CPT

## 2022-10-03 PROCEDURE — 85025 COMPLETE CBC W/AUTO DIFF WBC: CPT

## 2022-10-03 PROCEDURE — 99284 EMERGENCY DEPT VISIT MOD MDM: CPT | Mod: FS

## 2022-10-03 PROCEDURE — 81001 URINALYSIS AUTO W/SCOPE: CPT

## 2022-10-03 PROCEDURE — 83690 ASSAY OF LIPASE: CPT

## 2022-10-03 PROCEDURE — 80053 COMPREHEN METABOLIC PANEL: CPT

## 2022-10-03 NOTE — ED PROVIDER NOTE - NSICDXPASTMEDICALHX_GEN_ALL_CORE_FT
PAST MEDICAL HISTORY:  Carpal tunnel syndrome     Cholecystitis     GERD (gastroesophageal reflux disease)     Meniscal injury right knee    Shingles     Unilateral primary osteoarthritis, right knee

## 2022-10-03 NOTE — ED PROVIDER NOTE - PATIENT PORTAL LINK FT
You can access the FollowMyHealth Patient Portal offered by [facility] by registering at the following website: [email or web address]. By joining [employer]’s FollowMyHealth portal, you will also be able to view your health information using other applications (apps) compatible with our system.

## 2022-10-03 NOTE — ED PROVIDER NOTE - CLINICAL SUMMARY MEDICAL DECISION MAKING FREE TEXT BOX
Anel Murphy MD - Attending Physician: Pt here with urinary frequency/suprapubic pain s/p urination only. S/p multiple abx for presumed UTI by UC and PMD. No fevers. No flank pain/tenderness. Exam completely benign without any tenderness. Well hydrated. Labs/UA

## 2022-10-03 NOTE — ED PROVIDER NOTE - NSFOLLOWUPCLINICS_GEN_ALL_ED_FT
[facility] - Urology  Urology  [address], [address] & [address] [place] [address]  Phone: [phone]  Fax:   Follow Up Time: 1-3 Days

## 2022-10-03 NOTE — ED PROVIDER NOTE - NSICDXFAMILYHX_GEN_ALL_CORE_FT
FAMILY HISTORY:  Father  Still living? Yes, Estimated age: Age Unknown  Family history of diabetes mellitus (DM), Age at diagnosis: Age Unknown  Family history of other heart disease, Age at diagnosis: Age Unknown    Mother  Still living? Yes, Estimated age: Age Unknown  Family history of GERD, Age at diagnosis: Age Unknown

## 2022-10-03 NOTE — ED PROVIDER NOTE - NSFOLLOWUPINSTRUCTIONS_ED_ALL_ED_FT
it is important to follow up with your primary care doctor and urology in 1-2 days regarding today's visit.   make sure to bring a copy of your results with you to your follow up appointments  if your symptoms worsen, persist, or if any new symptoms develop such as burning on urination, blood in urine, abdominal cramping/pain, fever, chills, or any other new or concerning symptoms, or any signs of distress, return to the ER right away.    [facility] - Urology  Urology  [address], [address]rd & 4th [place] [address]  Phone: [phone]  Fax:   Follow Up Time: 1-3 Days

## 2022-10-03 NOTE — ED ADULT NURSE NOTE - HIV OFFER
Panel Management Review      Patient has the following on his problem list:     Asthma review     ACT Total Scores 12/10/2018   ACT TOTAL SCORE -   ASTHMA ER VISITS -   ASTHMA HOSPITALIZATIONS -   ACT TOTAL SCORE (Goal Greater than or Equal to 20) 24   In the past 12 months, how many times did you visit the emergency room for your asthma without being admitted to the hospital? 0   In the past 12 months, how many times were you hospitalized overnight because of your asthma? 0      1. Is Asthma diagnosis on the Problem List? Yes    2. Is Asthma listed on Health Maintenance? Yes    3. Patient is due for:  ACT      Composite cancer screening  Chart review shows that this patient is due/due soon for the following None  Summary:    Patient is due/failing the following:   ACT    Action needed:   Patient needs to do ACT.    Type of outreach:    Sent [email or web address] message.    Questions for provider review:    None                                                                                                                                    Alexa Ferreira MA     Chart routed to Care Team .           Opt out

## 2022-10-03 NOTE — ED PROVIDER NOTE - RAPID ASSESSMENT
65 y/o F presents to the ED c/o persistent UTI [id number]. Pt currently c/o abdominal pain and nausea w/ back pain. Pt has been on 3 rounds of different antibiotics w/ no relief. Denies any fever or any other acute complaints.    Anel NOVAK (Scribrodney) have documented this rapid assessment note under the dictation of Nancy Talamantes (PA) which has been reviewed and affirmed to be accurate. Patient was seen as a QPA patient. The patient will be seen and further worked up in the main emergency department and their care will be completed by the main emergency department team along with a thorough physical exam. Receiving team will follow up on labs, analgesia, any clinical imaging, reassess and disposition as clinically indicated, all decisions regarding the progression of care will be made at their discretion. 67 y/o F presents to the ED c/o persistent UTI [id number]. Pt currently c/o abdominal pain and nausea w/ back pain. Pt has been on 3 rounds of different antibiotics w/ no relief. Denies any fever or any other acute complaints.    Anel NOVAK (Deny) have documented this rapid assessment note under the dictation of Nancy Talamantes (PA) which has been reviewed and affirmed to be accurate. Patient was seen as a QPA patient. The patient will be seen and further worked up in the main emergency department and their care will be completed by the main emergency department team along with a thorough physical exam. Receiving team will follow up on labs, analgesia, any clinical imaging, reassess and disposition as clinically indicated, all decisions regarding the progression of care will be made at their discretion.    AMBER NOVAK, personally performed the service described in the documentation recorded by the scribe in my presence, and it accurately and completely records my words and actions.

## 2022-10-03 NOTE — ED PROVIDER NOTE - PROGRESS NOTE DETAILS
Abhijeet CLARK: labs and urine reviewed. patient declines pain medication at this time. advised patient to follow up with her primary care. in addition will give patient referral to urology as symptoms may be due to bladder spasms. patient in agreement with plan. strict return precautions discussed. all questions answered. stable for discharge. discussed with Dr. Dailey.

## 2022-10-03 NOTE — ED PROVIDER NOTE - NS ED ATTENDING STATEMENT MOD
This was a shared visit with the TABATHA. I reviewed and verified the documentation and independently performed the documented:

## 2022-10-03 NOTE — ED PROVIDER NOTE - OBJECTIVE STATEMENT
65 y/o female, hx of GERD, presents to the ER with complaint of persistent UTI x one month.  was on three different antibiotics.  was given two abx by an urgent care (does not recall the name) and was put on Bactrim by her PCP. states she was told if Bactrim does not help, follow up with the ER.  has urinary frequency and after urinating feels bladder discomfort and lower back pain. admits to nausea at times as well. denies f/v/d, hematuria, dysuria.

## 2022-10-03 NOTE — ED PROVIDER NOTE - NSICDXPASTSURGICALHX_GEN_ALL_CORE_FT
PAST SURGICAL HISTORY:  History of arthroscopy of left shoulder 2018    S/P  ,     S/P carpal tunnel release B/L    S/P cholecystectomy     S/P right knee arthroscopy x2

## 2022-10-03 NOTE — ED ADULT NURSE NOTE - OBJECTIVE STATEMENT
66 yr old female with  from home c/o urinary frequency, UTI sx x 1 month, has been on multiple abx by PMD with transient relief, +admits to voiding normal amount of urine but frequently, denies fever/chills, denies hematuria, otherwise well appearing, clear lungs, abd soft nontender, skin wdi, vitals stable, afebrile

## 2022-10-04 LAB
CULTURE RESULTS: SIGNIFICANT CHANGE UP
SPECIMEN SOURCE: SIGNIFICANT CHANGE UP

## 2022-11-04 ENCOUNTER — OUTPATIENT (OUTPATIENT)
Dept: OUTPATIENT SERVICES | Facility: HOSPITAL | Age: 67
LOS: 1 days | End: 2022-11-04
Payer: MEDICARE

## 2022-11-04 ENCOUNTER — APPOINTMENT (OUTPATIENT)
Dept: ULTRASOUND IMAGING | Facility: IMAGING CENTER | Age: 67
End: 2022-11-04

## 2022-11-04 ENCOUNTER — APPOINTMENT (OUTPATIENT)
Dept: UROLOGY | Facility: CLINIC | Age: 67
End: 2022-11-04

## 2022-11-04 VITALS — SYSTOLIC BLOOD PRESSURE: 137 MMHG | HEART RATE: 73 BPM | DIASTOLIC BLOOD PRESSURE: 81 MMHG

## 2022-11-04 DIAGNOSIS — N94.10 UNSPECIFIED DYSPAREUNIA: ICD-10-CM

## 2022-11-04 DIAGNOSIS — R10.2 PELVIC AND PERINEAL PAIN: ICD-10-CM

## 2022-11-04 DIAGNOSIS — R33.9 RETENTION OF URINE, UNSPECIFIED: ICD-10-CM

## 2022-11-04 DIAGNOSIS — Z98.890 OTHER SPECIFIED POSTPROCEDURAL STATES: Chronic | ICD-10-CM

## 2022-11-04 DIAGNOSIS — Z87.440 PERSONAL HISTORY OF URINARY (TRACT) INFECTIONS: ICD-10-CM

## 2022-11-04 DIAGNOSIS — Z98.891 HISTORY OF UTERINE SCAR FROM PREVIOUS SURGERY: Chronic | ICD-10-CM

## 2022-11-04 DIAGNOSIS — Z90.49 ACQUIRED ABSENCE OF OTHER SPECIFIED PARTS OF DIGESTIVE TRACT: Chronic | ICD-10-CM

## 2022-11-04 PROCEDURE — 99204 OFFICE O/P NEW MOD 45 MIN: CPT

## 2022-11-04 PROCEDURE — 76770 US EXAM ABDO BACK WALL COMP: CPT

## 2022-11-04 PROCEDURE — 76770 US EXAM ABDO BACK WALL COMP: CPT | Mod: 26

## 2022-11-04 NOTE — ASSESSMENT
[FreeTextEntry1] : here for f/u after ER visit for SP presssure and hx of utis \par seen initally at walk in clinic for utis - abx given - 2 diff \par sl better but stil present then went to ER\par denies associated sex, no fevr or N/V or hematuria\par bowle habits OK \par admits to a lot of fluid intake \par admits to use of VIBRATOR prior to this event and holding urine and then pushing to void - was using vibrator due to pain with sex \par here due to cont sxs of SP pressure sugg of UTI but recen tER visit showed ur and cx:  negatvie \par creat- 0.82\par last pola ( neg) 2016\par here for further eval :\par 1- pvr 131 ml \par 2- check pola and bladder us with pvr check again \par 3- pelvic muscles tight and tender on exam - pelvic floor rehab but until then - anti-infla, sitz  bath and no pusing to void/BM  or holding urine

## 2022-11-04 NOTE — PHYSICAL EXAM
[General Appearance - Well Developed] : well developed [General Appearance - Well Nourished] : well nourished [Normal Appearance] : normal appearance [Well Groomed] : well groomed [General Appearance - In No Acute Distress] : no acute distress [Edema] : no peripheral edema [Respiration, Rhythm And Depth] : normal respiratory rhythm and effort [Exaggerated Use Of Accessory Muscles For Inspiration] : no accessory muscle use [Abdomen Soft] : soft [Abdomen Tenderness] : non-tender [Abdomen Mass (___ Cm)] : no abdominal mass palpated [Abdomen Hernia] : no hernia was discovered [Costovertebral Angle Tenderness] : no ~M costovertebral angle tenderness [Urethral Meatus] : normal urethra [External Female Genitalia] : normal external genitalia [Vagina] : normal vaginal exam [FreeTextEntry1] : soft urethra, urethra and bladder not tender, pelvic muscles tender and taught bilat , no discharge + dryness  [Normal Station and Gait] : the gait and station were normal for the patient's age [] : no rash [No Focal Deficits] : no focal deficits [No Palpable Adenopathy] : no palpable adenopathy

## 2022-11-04 NOTE — HISTORY OF PRESENT ILLNESS
[FreeTextEntry1] : here for f/u after ER visit for SP presssure and hx of utis \par seen initally at walk in clinic for utis - abx given - 2 diff \par sl better but stil present then went to ER\par denies associated sex, no fevr or N/V or hematuria\par bowle habits OK \par admits to a lot of fluid intake \par admits to use of VIBRATOR prior to this event and holding urine and then pushing to void \par here due to cont sxs of SP pressure sugg of UTI but recen tER visit showed ur and cx:  negatvie \par creat- 0.82\par last pola ( neg) 2016\par here for further eval :

## 2022-11-15 ENCOUNTER — NON-APPOINTMENT (OUTPATIENT)
Age: 67
End: 2022-11-15

## 2022-11-15 ENCOUNTER — APPOINTMENT (OUTPATIENT)
Dept: GASTROENTEROLOGY | Facility: CLINIC | Age: 67
End: 2022-11-15

## 2022-11-15 VITALS
OXYGEN SATURATION: 98 % | TEMPERATURE: 98.5 F | HEIGHT: 63 IN | RESPIRATION RATE: 18 BRPM | DIASTOLIC BLOOD PRESSURE: 80 MMHG | WEIGHT: 164 LBS | HEART RATE: 74 BPM | BODY MASS INDEX: 29.06 KG/M2 | SYSTOLIC BLOOD PRESSURE: 122 MMHG

## 2022-11-15 DIAGNOSIS — Z12.11 ENCOUNTER FOR SCREENING FOR MALIGNANT NEOPLASM OF COLON: ICD-10-CM

## 2022-11-15 DIAGNOSIS — Z12.12 ENCOUNTER FOR SCREENING FOR MALIGNANT NEOPLASM OF COLON: ICD-10-CM

## 2022-11-15 PROCEDURE — 99214 OFFICE O/P EST MOD 30 MIN: CPT

## 2022-11-15 RX ORDER — SODIUM SULFATE, MAGNESIUM SULFATE, AND POTASSIUM CHLORIDE 17.75; 2.7; 2.25 G/1; G/1; G/1
1479-225-188 TABLET ORAL
Qty: 1 | Refills: 0 | Status: ACTIVE | COMMUNITY
Start: 2022-11-15 | End: 1900-01-01

## 2022-11-15 RX ORDER — POLYETHYLENE GLYCOL-3350 AND ELECTROLYTES WITH FLAVOR PACK 240; 5.84; 2.98; 6.72; 22.72 G/278.26G; G/278.26G; G/278.26G; G/278.26G; G/278.26G
240 POWDER, FOR SOLUTION ORAL
Qty: 1 | Refills: 0 | Status: ACTIVE | COMMUNITY
Start: 2022-11-15 | End: 1900-01-01

## 2022-11-15 NOTE — ASSESSMENT
[FreeTextEntry1] : Imp:\par Stable GERD in setting of stress.\par Now s/p 2 weeks diarrhea with turmeric now better\par \par Plan: \par \par Pepto BID prn\par Continue Pepcid prn\par Colonoscopy in Feb 2023, r/b/a.\par \par Nutrition discussed in detail.

## 2022-11-28 NOTE — PROGRESS NOTE ADULT - PROBLEM SELECTOR PROBLEM 2
Gastroesophageal reflux disease without esophagitis Leukocytosis, unspecified type Suturegard Retention Suture: 2-0 Nylon

## 2022-12-25 ENCOUNTER — NON-APPOINTMENT (OUTPATIENT)
Age: 67
End: 2022-12-25

## 2022-12-26 ENCOUNTER — TRANSCRIPTION ENCOUNTER (OUTPATIENT)
Age: 67
End: 2022-12-26

## 2023-02-24 NOTE — END OF VISIT
[>50% of Time Spent on Counseling and Coordination of Care for  ___] : Greater than 50% of the encounter time was spent on counseling and coordination of care for [unfilled] [Time Spent: ___ minutes] : I have spent [unfilled] minutes of face to face time with the patient Spontaneous, unlabored and symmetrical

## 2023-02-27 ENCOUNTER — TRANSCRIPTION ENCOUNTER (OUTPATIENT)
Age: 68
End: 2023-02-27

## 2023-02-27 ENCOUNTER — RESULT REVIEW (OUTPATIENT)
Age: 68
End: 2023-02-27

## 2023-02-27 ENCOUNTER — APPOINTMENT (OUTPATIENT)
Dept: GASTROENTEROLOGY | Facility: HOSPITAL | Age: 68
End: 2023-02-27

## 2023-02-27 ENCOUNTER — OUTPATIENT (OUTPATIENT)
Dept: OUTPATIENT SERVICES | Facility: HOSPITAL | Age: 68
LOS: 1 days | End: 2023-02-27
Payer: MEDICARE

## 2023-02-27 DIAGNOSIS — Z98.890 OTHER SPECIFIED POSTPROCEDURAL STATES: Chronic | ICD-10-CM

## 2023-02-27 DIAGNOSIS — Z12.12 ENCOUNTER FOR SCREENING FOR MALIGNANT NEOPLASM OF RECTUM: ICD-10-CM

## 2023-02-27 DIAGNOSIS — Z12.11 ENCOUNTER FOR SCREENING FOR MALIGNANT NEOPLASM OF COLON: ICD-10-CM

## 2023-02-27 DIAGNOSIS — Z90.49 ACQUIRED ABSENCE OF OTHER SPECIFIED PARTS OF DIGESTIVE TRACT: Chronic | ICD-10-CM

## 2023-02-27 DIAGNOSIS — Z98.891 HISTORY OF UTERINE SCAR FROM PREVIOUS SURGERY: Chronic | ICD-10-CM

## 2023-02-27 PROCEDURE — 43239 EGD BIOPSY SINGLE/MULTIPLE: CPT | Mod: 59

## 2023-02-27 PROCEDURE — 88305 TISSUE EXAM BY PATHOLOGIST: CPT | Mod: 26

## 2023-02-27 PROCEDURE — 43239 EGD BIOPSY SINGLE/MULTIPLE: CPT

## 2023-02-27 PROCEDURE — 45380 COLONOSCOPY AND BIOPSY: CPT

## 2023-02-27 PROCEDURE — 88312 SPECIAL STAINS GROUP 1: CPT | Mod: 26

## 2023-02-27 PROCEDURE — 45380 COLONOSCOPY AND BIOPSY: CPT | Mod: PT

## 2023-02-27 PROCEDURE — 88305 TISSUE EXAM BY PATHOLOGIST: CPT

## 2023-02-27 PROCEDURE — 88312 SPECIAL STAINS GROUP 1: CPT

## 2023-02-27 RX ORDER — SODIUM CHLORIDE 9 MG/ML
500 INJECTION INTRAMUSCULAR; INTRAVENOUS; SUBCUTANEOUS
Refills: 0 | Status: COMPLETED | OUTPATIENT
Start: 2023-02-27 | End: 2023-02-27

## 2023-02-27 RX ADMIN — SODIUM CHLORIDE 75 MILLILITER(S): 9 INJECTION INTRAMUSCULAR; INTRAVENOUS; SUBCUTANEOUS at 10:41

## 2023-02-28 LAB — SURGICAL PATHOLOGY STUDY: SIGNIFICANT CHANGE UP

## 2023-03-03 ENCOUNTER — TRANSCRIPTION ENCOUNTER (OUTPATIENT)
Age: 68
End: 2023-03-03

## 2023-06-15 NOTE — OCCUPATIONAL THERAPY INITIAL EVALUATION ADULT - NS ASR OT EQUIP NEEDS DISCH
To be determined at [facility] Counseling:  I discussed with the patient the risks of mycophenolate mofetil including but not limited to infection/immunosuppression, GI upset, hypokalemia, hypercholesterolemia, bone marrow suppression, lymphoproliferative disorders, malignancy, GI ulceration/bleed/perforation, colitis, interstitial lung disease, kidney failure, progressive multifocal leukoencephalopathy, and birth defects.  The patient understands that monitoring is required including a baseline creatinine and regular CBC testing. In addition, patient must alert us immediately if symptoms of infection or other concerning signs are noted.

## 2024-04-01 NOTE — ASU PREOP CHECKLIST - PATIENT PROBLEMS/NEEDS
Hpi Title: Evaluation of Skin Lesions How Severe Are Your Spot(S)?: moderate Have Your Spot(S) Been Treated In The Past?: has not been treated Additional History: FBSE Patient expressed no known problems or needs

## 2024-05-14 NOTE — PATIENT PROFILE ADULT - ANY SIGNIFICANT CHANGE IN ABILITY TO PERFORM THE FOLLOWING ADL SINCE THE ONSET OF PRESENT ILLNESS?
[FreeTextEntry1] : Rib x-rays performed on 11/24/2021 reveals no fracture  X-rays of the thoracic spine performed on 11/24/2021 reveals mild left adult scoliosis  X-ray of the right shoulder performed on November 17, 2022 reveals mild calcific bursitis  MRI of the right shoulder performed on May 11, 2023 reveals Mild to moderate supraspinatus tendinosis with a small focus of calcific tendinitis at the posterior aspect of the insertion.  There is fraying of the articular margin.  There is no discrete tear.  There is mild overlying subacromial/subdeltoid bursitis.  no

## 2024-08-29 NOTE — ASU DISCHARGE PLAN (ADULT/PEDIATRIC). - NOTIFY
[FreeTextEntry1] : Endoscopy from 07/29/2024 resulted in H-Pylori  Colonoscopy from 07/29/2024 - small polyp 5mm and internal hemorrhoids, diverticulum of sigmoid colon    Patient still complaining of Epigastric pain, feels like rumbling.  No tenderness when palpating abdomen.  MILD
Numbness, color, or temperature change to extremity/Unable to Urinate/Pain not relieved by Medications/Bleeding that does not stop/Fever greater than 101/Persistent Nausea and Vomiting

## 2025-03-14 NOTE — ED ADULT TRIAGE NOTE - ACCOMPANIED BY
CARDIOVASCULAR PROGRESS NOTE    REASON FOR CONSULT:   Joanne Peña is a 71 y.o. female who presents for follow visit.    She was last time seen in clinic on 12/30/2025.    She used to follow up at [facility] for her cardiology issues and then switched her cardiology care to [facility].      She still follows up with EP at [facility].  HISTORY OF PRESENT ILLNESS:     She has past medical history of hypertension, hyperlipidemia, cardioembolic stroke (no documented atrial fibrillation on monitoring so far - has ILR in place), nonischemic cardiomyopathy with most recent EF around 30-35%, and type 2 diabetes mellitus.    She was at [facility] in January, 2025 for CHF exacerbation.  She remained in ICU.  She had acute on chronic kidney disease.  She was given IV diuresis.  She was placed on BiPAP.  She diuresed well.  She was started on hydralazine and losartan was stopped.    Today she comes for follow up.  She has been feeling much better.  No chest pain, palpitations orthopnea or PND.  She has stable exertional shortness of breath.  She takes Lasix 40 mg in the morning and 20 mg in the evening.    She is a retired nurse by profession.  She quit smoking in 2023.  She does not drink EtOH.    PAST MEDICAL HISTORY:     Past Medical History:   Diagnosis Date    Diabetes mellitus     Hyperlipidemia     Hypertension     Stroke        PAST SURGICAL HISTORY:     Past Surgical History:   Procedure Laterality Date    COLONOSCOPY N/A 7/12/2023    Procedure: COLONOSCOPY;  Surgeon: Ray Sorensen MD;  Location: [facility];  Service: Endoscopy;  Laterality: N/A;  instr via portal  - PC  4/10/23 Daniel, instr via mail & portal, unable to tolerate Golytely- request Miralax/Gatorade - PC  5/30-pt r/s-new instr portal-tb    INJECTION OF ANESTHETIC AGENT AROUND MEDIAL BRANCH NERVES INNERVATING LUMBAR FACET JOINT Bilateral 4/20/2023    Procedure: MBB #1 (B/L)  L3,4,5;  Surgeon: Son Lara DO;  Location: [facility] OR;  Service: Pain Management;  Laterality: Bilateral;  ORAL XANAX    INJECTION OF ANESTHETIC AGENT AROUND MEDIAL BRANCH NERVES INNERVATING LUMBAR FACET JOINT Bilateral 5/5/2023    Procedure: MBB #2 (B/L) L3,4,5;  Surgeon: Son Lara DO;  Location: [facility] OR;  Service: Pain Management;  Laterality: Bilateral;  Oral Xanax    INJECTION OF JOINT Right 5/20/2022    Procedure: Right SI joint injection;  Surgeon: Son Lara DO;  Location: [facility] OR;  Service: Pain Management;  Laterality: Right;    INJECTION, SACROILIAC JOINT Right 12/19/2023    Procedure: RT SI joint Inj;  Surgeon: Son Lara DO;  Location: [facility] PAIN MANAGEMENT;  Service: Pain Management;  Laterality: Right;  oral    INSERTION OF IMPLANTABLE LOOP RECORDER Left 6/21/2024    Procedure: Insertion, Implantable Loop Recorder;  Surgeon: Hunter Rich MD;  Location: [facility] EP LAB;  Service: Cardiology;  Laterality: Left;  CVA, ILR, BSCI, Local, [place], 3 Prep    INSERTION OF IMPLANTABLE LOOP RECORDER Left 8/30/2024    Procedure: Insertion, Implantable Loop Recorder;  Surgeon: Hunter Rich MD;  Location: [facility] EP LAB;  Service: Cardiology;  Laterality: Left;  CVA, ILR,MDT, RN [place], [place], 3 Prep    RADIOFREQUENCY ABLATION OF LUMBAR MEDIAL BRANCH NERVE AT SINGLE LEVEL Bilateral 5/19/2023    Procedure: RFA (B/L) L3,4,5;  Surgeon: Son Lara DO;  Location: [facility] PAIN MANAGEMENT;  Service: Pain Management;  Laterality: Bilateral;    REMOVAL OF IMPLANTABLE LOOP RECORDER N/A 7/10/2024    Procedure: REMOVAL, IMPLANTABLE LOOP RECORDER;  Surgeon: Hunter Rich MD;  Location: [facility] EP LAB;  Service: Cardiology;  Laterality: N/A;       ALLERGIES AND MEDICATION:     Review of patient's allergies indicates:   Allergen Reactions    Lisinopril-hydrochlorothiazide Other (See Comments)     Pt stated it makes her feel drained. She couldn't raise arms over head.    Ampicillin  Lilly Hurt a500 [propoxyphene n-acetaminophen] Other (See Comments)     karl        Medication List            Accurate as of March 14, 2025 10:19 AM. If you have any questions, ask your nurse or doctor.                CHANGE how you take these medications      * furosemide 40 MG tablet  Commonly known as: LASIX  Take 1 tablet (40 mg total) by mouth once daily.  What changed: Another medication with the same name was changed. Make sure you understand how and when to take each.  Changed by: Álvaro Rudd MD     * furosemide 40 MG tablet  Commonly known as: LASIX  Take 0.5 tablets (20 mg total) by mouth once daily.  What changed: how much to take  Changed by: Álvaro Rudd MD           * This list has 2 medication(s) that are the same as other medications prescribed for you. Read the directions carefully, and ask your doctor or other care provider to review them with you.                CONTINUE taking these medications      ACCU-CHEK GUIDE GLUCOSE METER Misc  Generic drug: blood-glucose meter     ascorbic acid (vitamin C) 500 MG tablet  Commonly known as: VITAMIN C     aspirin 81 MG EC tablet  Commonly known as: ECOTRIN  Take 1 tablet (81 mg total) by mouth once daily.     atorvastatin 80 MG tablet  Commonly known as: LIPITOR  TAKE 1 TABLET (80 MG TOTAL) BY MOUTH ONCE DAILY.     * blood sugar diagnostic Strp  To check BG daily, to use with insurance preferred meter     * blood sugar diagnostic Strp  To check BG daily, to use with insurance preferred meter     cyclobenzaprine 10 MG tablet  Commonly known as: FLEXERIL  TAKE 1 TABLET (10 MG TOTAL) BY MOUTH 2 (TWO) TIMES DAILY AS NEEDED FOR MUSCLE SPASMS (BACK PAIN).     empagliflozin 25 mg tablet  Commonly known as: JARDIANCE  Take 1 tablet (25 mg total) by mouth once daily.     HumaLOG Mix 75-25(U-100)Insuln 100 unit/mL (75-25) Susp  Generic drug: insulin lispro protamine-insulin lispro  Inject 10 Units into the skin 2 (two) times daily  "before meals.     hydrALAZINE 25 MG tablet  Commonly known as: APRESOLINE  Take 1 tablet (25 mg total) by mouth 3 (three) times daily.     insulin syringe-needle U-100 0.5 mL 31 gauge x 5/16" Syrg  60 each by Misc.(Non-Drug; Combo Route) route 2 (two) times a day.     * lancets Misc  To check BG daily, to use with insurance preferred meter     * lancets Misc  To check BG daily, to use with insurance preferred meter     LORazepam 0.5 MG tablet  Commonly known as: ATIVAN  Take 1 tablet (0.5 mg total) by mouth once. for 1 dose     meclizine 25 mg tablet  Commonly known as: ANTIVERT  Take 1 tablet (25 mg total) by mouth 2 (two) times daily as needed for Dizziness.     metoprolol succinate 25 MG 24 hr tablet  Commonly known as: TOPROL-XL  Take 0.5 tablets (12.5 mg total) by mouth once daily.     MOUNJARO 2.5 mg/0.5 mL Pnij  Generic drug: tirzepatide  Inject 2.5 mg into the skin every 7 days.     multivitamin per tablet  Commonly known as: THERAGRAN     promethazine-dextromethorphan 6.25-15 mg/5 mL Syrp  Commonly known as: PROMETHAZINE-DM  Take 5 mLs by mouth nightly as needed (cough).     REPATHA SURECLICK 140 mg/mL Pnij  Generic drug: evolocumab  Inject 1 mL (140 mg total) into the skin every 14 (fourteen) days.           * This list has 4 medication(s) that are the same as other medications prescribed for you. Read the directions carefully, and ask your doctor or other care provider to review them with you.                   Where to Get Your Medications        These medications were sent to [facility]pharmacy #[id number] - [place] - [address]  [address], [place] [address]      Phone: [phone]   furosemide 40 MG tablet  furosemide 40 MG tablet         SOCIAL HISTORY:     Social History     Socioeconomic History    Marital status:    Tobacco Use    Smoking status: Former     Current packs/day: 0.00     Types: Cigarettes     Quit date: 2/6/2022     Years since quitting: 3.1     Passive exposure: Past    Smokeless " tobacco: Never    Tobacco comments:     Patient Quit Smoking on 02/06/2022.   Substance and Sexual Activity    Alcohol use: Not Currently    Drug use: No    Sexual activity: Not Currently     Partners: Male     Social Drivers of Health     Financial Resource Strain: High Risk (1/18/2025)    Overall Financial Resource Strain (CARDIA)     Difficulty of Paying Living Expenses: Hard   Food Insecurity: Food Insecurity Present (1/18/2025)    Hunger Vital Sign     Worried About Running Out of Food in the Last Year: Sometimes true     Ran Out of Food in the Last Year: Sometimes true   Transportation Needs: No Transportation Needs (1/18/2025)    PRAPARE - Transportation     Lack of Transportation (Medical): No     Lack of Transportation (Non-Medical): No   Physical Activity: Inactive (1/18/2025)    Exercise Vital Sign     Days of Exercise per Week: 0 days     Minutes of Exercise per Session: 0 min   Stress: No Stress Concern Present (1/18/2025)    [place] [place] [employer] - Occupational Stress Questionnaire     Feeling of Stress : Only a little   Recent Concern: Stress - Stress Concern Present (1/5/2025)    [place] [place] [employer] - Occupational Stress Questionnaire     Feeling of Stress : To some extent   Housing Stability: High Risk (1/18/2025)    Housing Stability Vital Sign     Unable to Pay for Housing in the Last Year: Yes     Homeless in the Last Year: No       FAMILY HISTORY:     Family History   Problem Relation Name Age of Onset    Kidney disease Mother      Heart disease Mother      Cancer Father      Hypertension Brother      Hypertension Daughter      Cancer Maternal Aunt         REVIEW OF SYSTEMS:   ROS    Constitution: Negative for chills, fever, weight gain and weight loss.   Eyes: Negative for blurry vision, visual changes    Cardiovascular: Negative for chest pain. Negative for claudication, dyspnea on exertion, leg swelling, orthopnea, palpitations, paroxysmal nocturnal  "dyspnea.   Respiratory:  Has some exertional shortness of breath, Negative for cough.    Endocrine: Negative for heat or cold intolerance    Hematologic/Lymphatic: Negative for easy bruising or bleeding    Skin: Negative for color change and rash.   Musculoskeletal: Negative for neck pain, arthralgias, myalgias    Gastrointestinal: Negative for abdominal pain, nausea, vomiting, diarrhea  Neurological: Negative for dizziness, light-headedness and loss of balance.   Psychiatric/Behavioral: Negative for altered mental status.    PHYSICAL EXAM:     Vitals:    03/14/25 0942   BP: 116/74   Pulse: 70   Resp: 15    Body mass index is 26.57 kg/m².  Weight: 76.9 kg (169 lb 10.3 oz)   Height: 5' 7" (170.2 cm)     Gen: NAD  Head/Eyes/Ears/Nose: MMM, good dentition   Neck: No carotid bruits, no JVD  Lung: Clear to auscultation bilaterally, no wheezes/rales/ronchi, symmetrical lung expansion with inspiration  Heart: Normal S1/S2, regular rate and rhythm, no murmurs/rubs/gallops  Abdomen: Soft, NT/ND, no masses  Extremities: No lower extremity edema.  No wounds or other skin lesions  Skin: Normal color and turgor. No wounds rashes, no petechia, no ecchymoses.   Neuro: AAOx3    DATA:     Laboratory:  CBC:  Recent Labs   Lab 01/15/25  1000 02/13/25  1017 02/22/25  0858   WBC 4.57 5.31 5.21   Hemoglobin 10.8 L 10.7 L 11.3 L   Hematocrit 37.4 36.8 L 38.2   Platelets 305 291 269       CHEMISTRIES:  Recent Labs   Lab 05/06/22  1501 06/01/22  0858 09/13/22  0916 01/05/25  0857 01/06/25  0410 01/07/25  0451 01/15/25  1000 02/13/25  1017 02/22/25  0858   Glucose 116 H 168 H   < > 231 H 249 H 218 H 152 H 131 H 137 H  137 H   Sodium 137 137   < > 139 136 136 140 137 142  142   Potassium 3.6 4.2   < > 4.4 4.2 4.6 5.0 4.8 4.3  4.3   BUN 21 20   < > 49 H 56 H 66 H 52 H 60 H 47 H  47 H   Creatinine 1.1 1.1   < > 2.1 H 2.2 H 2.3 H 2.1 H 2.1 H 1.9 H  1.9 H   eGFR if non  51.3 A 51.3 A  --   --   --   --   --   --   --    eGFR  " --   --    < > 25 A 23 A 22 A 25 A 24.6 A 28 A  28 A   Calcium 9.2 9.4   < > 9.4 9.1 9.4 9.2 9.5 9.0  9.0   Magnesium  --   --    < > 1.9 1.9 2.1 2.5  --  2.0    < > = values in this interval not displayed.       CARDIAC BIOMARKERS:  Recent Labs   Lab 01/03/25 2042   Troponin I 0.031 H       HBA1C:  Hemoglobin A1C   Date Value Ref Range Status   02/13/2025 8.5 (H) 4.0 - 5.6 % Final     Comment:     ADA Screening Guidelines:  5.7-6.4%  Consistent with prediabetes  >or=6.5%  Consistent with diabetes    High levels of fetal hemoglobin interfere with the HbA1C  assay. Heterozygous hemoglobin variants (HbS, HgC, etc)do  not significantly interfere with this assay.   However, presence of multiple variants may affect accuracy.     11/14/2024 9.8 (H) 4.0 - 5.6 % Final     Comment:     ADA Screening Guidelines:  5.7-6.4%  Consistent with prediabetes  >or=6.5%  Consistent with diabetes    High levels of fetal hemoglobin interfere with the HbA1C  assay. Heterozygous hemoglobin variants (HbS, HgC, etc)do  not significantly interfere with this assay.   However, presence of multiple variants may affect accuracy.     08/16/2024 6.9 (H) 4.0 - 5.6 % Final     Comment:     ADA Screening Guidelines:  5.7-6.4%  Consistent with prediabetes  >or=6.5%  Consistent with diabetes    High levels of fetal hemoglobin interfere with the HbA1C  assay. Heterozygous hemoglobin variants (HbS, HgC, etc)do  not significantly interfere with this assay.   However, presence of multiple variants may affect accuracy.          COAGS:  Recent Labs   Lab 08/16/24  0931   INR 0.9       LIPIDS/LFTS:  Recent Labs   Lab 05/13/24  0805 11/14/24  0910 01/03/25  2042 01/15/25  1000 02/13/25  1017 02/22/25  0858   Cholesterol 188 241 H  --   --  205 H  --    Triglycerides 143 219 H  --   --  145  --    HDL 45 43  --   --  43  --    LDL Cholesterol 114.4 154.2  --   --  133.0  --    Non-HDL Cholesterol 143 198  --   --  162  --    AST 14  --    < > 10 25 14   ALT  18  --    < > 10 9 L 10    < > = values in this interval not displayed.         CARDIAC DIAGNOSTICS:  :     [id number]  Sinus rhythm, nonspecific IVCD    EKG done on 2024 showed sinus rhythm and left bundle-branch block    ECHO  2025    Left Ventricle: Septal motion is consistent with bundle branch block. There is moderately reduced systolic function with a visually estimated ejection fraction of 30 - 35%. Grade II diastolic dysfunction. Elevated left ventricular filling pressure.    Right Ventricle: Systolic function is normal.    Left Atrium: Left atrium is severely dilated.    Right Atrium: Right atrium is mildly dilated.    Aortic Valve: The aortic valve is a trileaflet valve. There is mild aortic valve sclerosis.    Mitral Valve: Mildly calcified leaflets. There is moderate regurgitation with an eccentric jet.    Tricuspid Valve: There is mild regurgitation.    Pulmonary Artery: The estimated pulmonary artery systolic pressure is 25 mmHg.    IVC/SVC: Normal venous pressure at 3 mmHg.    (2024)    Left Ventricle: The left ventricle is normal in size. Moderately increased wall thickness. There is moderate concentric hypertrophy. Regional wall motion abnormalities present (basal-mid anteroseptal and inferoseptal HK). There is mildly reduced systolic function with a visually estimated ejection fraction of 40 - 45%. There is diastolic dysfunction but grade cannot be determined.    Right Ventricle: Normal right ventricular cavity size. Systolic function is normal.    3.  STRESS TEST (PET scan 2024)    There are no clinically significant perfusion abnormalities. There is a small (<10%) amount of mild resting heterogeneity in the anteroseptal wall(s) that improves with stress, indicative of non-obstructive disease.    The whole heart absolute myocardial perfusion values averaged 0.90 cc/min/g at rest, which is normal; 1.52 cc/min/g at stress, which is mildly reduced; and CFR is 1.72, which is  mildly reduced.    CT attenuation images demonstrate mild diffuse coronary calcifications in the LAD territory and no aortic calcifications.    The gated perfusion images showed an ejection fraction of 32% at rest and 36% during stress. A normal ejection fraction is greater than 47%.    There is moderate global hypokinesis at rest and during stress.    The LV cavity size is normal at rest and stress.    The ECG portion of the study is negative for ischemia.    There were no arrhythmias during stress.    The patient reported no chest pain during the stress test.    There are no prior studies for comparison.    4.  CARDIAC CATHETERIZATION  NA    5. OTHERS  JOSE EDUARDO 2/2025  Right leg:   Mildly decreased JOSE EDUARDO, 0.82.    Mild to moderately dampened PVR waveforms.  Decreased TBI.       Left leg:   Moderately decreased JOSE EDUARDO, 0.61.    Mild to moderately dampened PVR waveforms.    Decreased TBI.       (2/2025)  A1C8.5 (2/2025)    ASSESSMENT:   Nonischemic cardiomyopathy with EF around 30-35%  Hypertension  Hyperlipidemia  Left bundle-branch block  Cardioembolic stroke (no documented Afib - his ILR in place)  Peripheral arterial disease (mildly decreased ABIs 2/2025 -> F/U with vascular surgery)  Type 2 diabetes mellitus    Appears to be euvolemic and in well perfused state. NYHA class 2 dyspnea.  Has left bundle-branch block.  Most recent EF is 30-35% (echo January, 2025).  Not on ACE inhibitor/ARBs due to chronic kidney disease    PLAN:   Continue with baby aspirin/atorvastatin  Blood pressure stable today  Continue hydralazine 25 mg t.i.d. and metoprolol succinate 25 mg daily  Continue empagliflozin 25 mg daily  She has normal creatinine few months ago.  Now creatinine is around 1.8-2.0.  ACE inhibitor/Aldactone not started due to kidney insufficiency.  She has nephrology appointment in couple of months.  Would restart ACE inhibitor/Aldactone if okay from Nephrology standpoint  Continue to take Lasix 40 mg in the morning  EMT/paramedic and 20 mg in early afternoon  Daily weight  Repeat echo in 3 months after maximally tolerated guideline directed medical therapy  If her EF remains less than 35% despite maximally tolerated guideline directed medical therapy and she remains in NYHA class II dyspnea, would refer to EP for cardiac resynchronization therapy  No documented AFib on recent ILR interrogation.  Follows up with EP at [facility]  Glycemic control improving.  Management per PCP  Dietary restriction and daily exercise    Follow up in 3 months    Álvaro Rudd MD  [facility]

## 2025-05-03 NOTE — H&P PST ADULT - NS PRO REFERRAL CMGT
Retail Pharmacy Prior Authorization Team   Phone: [phone]    PA Initiation    Medication: ZOLPIDEM TARTRATE 5 MG PO TABS  Insurance Company: [employer] - Phone [phone] Fax [phone]  Pharmacy Filling the Rx: [id number] - [place], [place] - [address] [place] AT [facility]  [address]  Filling Pharmacy Phone: [phone]  Filling Pharmacy Fax:    Start Date: 5/3/2025    Note: Due to record-high volumes, our turn-around time is taking longer than usual . We are currently 3  business days behind in the pools.   We are working diligently to submit all requests in a timely manner and in the order they are received. Please only flag TRUE URGENT requests as high priority to the pool at this time.   If you have questions on status of PA's,  please send a note/message in the active PA encounter and send back to the [facility] PA pool [[phone]].    If you have questions about the turn-around time or about our process, please reach out to our supervisor Karen Wolf.   Thank you!   RPPA (Retail Pharmacy Prior Authorization) team    [id number]     Discharge planning
